# Patient Record
Sex: MALE | Race: WHITE | Employment: OTHER | URBAN - METROPOLITAN AREA
[De-identification: names, ages, dates, MRNs, and addresses within clinical notes are randomized per-mention and may not be internally consistent; named-entity substitution may affect disease eponyms.]

---

## 2017-01-04 ENCOUNTER — OFFICE VISIT (OUTPATIENT)
Dept: DERMATOLOGY CLINIC | Facility: CLINIC | Age: 67
End: 2017-01-04

## 2017-01-04 DIAGNOSIS — L57.0 AK (ACTINIC KERATOSIS): Primary | ICD-10-CM

## 2017-01-04 DIAGNOSIS — D23.9 BENIGN NEOPLASM OF SKIN, UNSPECIFIED LOCATION: ICD-10-CM

## 2017-01-04 DIAGNOSIS — L71.9 ROSACEA: ICD-10-CM

## 2017-01-04 DIAGNOSIS — L21.9 SEBORRHEIC DERMATITIS: ICD-10-CM

## 2017-01-04 PROCEDURE — 99213 OFFICE O/P EST LOW 20 MIN: CPT | Performed by: DERMATOLOGY

## 2017-01-04 PROCEDURE — G0463 HOSPITAL OUTPT CLINIC VISIT: HCPCS | Performed by: DERMATOLOGY

## 2017-01-04 RX ORDER — KETOCONAZOLE 20 MG/ML
SHAMPOO TOPICAL
Qty: 120 ML | Refills: 6 | Status: SHIPPED | OUTPATIENT
Start: 2017-01-04 | End: 2018-01-24

## 2017-01-11 PROCEDURE — 83036 HEMOGLOBIN GLYCOSYLATED A1C: CPT | Performed by: INTERNAL MEDICINE

## 2017-01-11 PROCEDURE — 36415 COLL VENOUS BLD VENIPUNCTURE: CPT | Performed by: INTERNAL MEDICINE

## 2017-01-11 PROCEDURE — 80061 LIPID PANEL: CPT | Performed by: INTERNAL MEDICINE

## 2017-01-11 PROCEDURE — 80053 COMPREHEN METABOLIC PANEL: CPT | Performed by: INTERNAL MEDICINE

## 2017-01-19 ENCOUNTER — TELEPHONE (OUTPATIENT)
Dept: DERMATOLOGY CLINIC | Facility: CLINIC | Age: 67
End: 2017-01-19

## 2017-01-30 NOTE — PROGRESS NOTES
Viraj Coyne is a 77year old male. HPI:     CC:  Patient presents with:  Full Skin Exam: Pt requesting full body skin evaluation. No personal or family history of skin cancer.          Allergies:  Review of patient's allergies indicates no known a unspecified hyperlipidemia    • CAD (coronary artery disease) 12/10/2014     Ptca/vinayak '04    • HTN (hypertension) 12/10/2014   • S/P coronary artery stent placement 2004   • H/O cardiovascular stress test      EKG stress test   • Obesity    • Allergic rhin total-body performed, including scalp, head, neck, face,nails, hair, external eyes, including conjunctival mucosa, eyelids, lips external ears, back, chest,/ breasts, axillae,  abdomen, arms, legs, palms.      Multiple light to medium brown, well marginated observe. Please refer to map for specific lesions. See additional diagnoses. Pros cons of various therapies, risks benefits discussed. Pathophysiology discussed with patient. Therapeutic options reviewed. See  Medications in grid.   Instructions revie

## 2017-05-03 ENCOUNTER — OFFICE VISIT (OUTPATIENT)
Dept: DERMATOLOGY CLINIC | Facility: CLINIC | Age: 67
End: 2017-05-03

## 2017-05-03 DIAGNOSIS — L57.0 AK (ACTINIC KERATOSIS): Primary | ICD-10-CM

## 2017-05-03 DIAGNOSIS — D23.5 BENIGN NEOPLASM OF SKIN OF TRUNK, EXCEPT SCROTUM: ICD-10-CM

## 2017-05-03 DIAGNOSIS — D23.30 BENIGN NEOPLASM OF SKIN OF FACE: ICD-10-CM

## 2017-05-03 DIAGNOSIS — D23.60 BENIGN NEOPLASM OF SKIN OF UPPER LIMB, INCLUDING SHOULDER, UNSPECIFIED LATERALITY: ICD-10-CM

## 2017-05-03 DIAGNOSIS — D23.70 BENIGN NEOPLASM OF SKIN OF LOWER LIMB, INCLUDING HIP, UNSPECIFIED LATERALITY: ICD-10-CM

## 2017-05-03 PROCEDURE — 99213 OFFICE O/P EST LOW 20 MIN: CPT | Performed by: DERMATOLOGY

## 2017-05-03 PROCEDURE — 17000 DESTRUCT PREMALG LESION: CPT | Performed by: DERMATOLOGY

## 2017-05-03 PROCEDURE — 17003 DESTRUCT PREMALG LES 2-14: CPT | Performed by: DERMATOLOGY

## 2017-05-22 NOTE — PROGRESS NOTES
Francois Thortnon is a 77year old male. HPI:     CC:  Patient presents with:  Actinic Keratosis: Pt here for 4 mo f/u. Pt states kmt plans to re-check pre-cancers at hands and upper body.   Pls check area at L upper thigh, new bumps where mole was re mouth daily.  Disp:  Rfl:      Allergies:   No Known Allergies    Past Medical History   Diagnosis Date   • Unspecified essential hypertension    • Other and unspecified hyperlipidemia    • CAD (coronary artery disease) 12/10/2014     Ptca/vinayak '04    • HTN complaints. No new or changeing lesions other than noted above. No fevers, chills, night sweats, unusual sun sensitivity. No other skin complaints. History, medications, allergies reviewed as noted.        Physical Examination:     Well-developed w podiatry. Patient does not want to consider surgery at this time. Discussed local measures to keep friction down has been applying Neosporin Band-Aid may continue with this. Please refer to map for specific lesions. See additional diagnoses.   Pros cons

## 2017-09-06 ENCOUNTER — OFFICE VISIT (OUTPATIENT)
Dept: DERMATOLOGY CLINIC | Facility: CLINIC | Age: 67
End: 2017-09-06

## 2017-09-06 DIAGNOSIS — D23.30 BENIGN NEOPLASM OF SKIN OF FACE: ICD-10-CM

## 2017-09-06 DIAGNOSIS — L21.9 SEBORRHEIC DERMATITIS: ICD-10-CM

## 2017-09-06 DIAGNOSIS — L71.9 ROSACEA: Primary | ICD-10-CM

## 2017-09-06 DIAGNOSIS — L57.0 AK (ACTINIC KERATOSIS): ICD-10-CM

## 2017-09-06 DIAGNOSIS — D23.9 BENIGN NEOPLASM OF SKIN, UNSPECIFIED LOCATION: ICD-10-CM

## 2017-09-06 PROCEDURE — 99213 OFFICE O/P EST LOW 20 MIN: CPT | Performed by: DERMATOLOGY

## 2017-09-06 PROCEDURE — G0463 HOSPITAL OUTPT CLINIC VISIT: HCPCS | Performed by: DERMATOLOGY

## 2017-09-06 RX ORDER — DOXYCYCLINE HYCLATE 100 MG/1
100 CAPSULE ORAL 2 TIMES DAILY
Qty: 60 CAPSULE | Refills: 2 | Status: SHIPPED | OUTPATIENT
Start: 2017-09-06 | End: 2018-01-24

## 2017-09-17 NOTE — PROGRESS NOTES
Lisa Carbajal is a 79year old male. HPI:     CC:  Patient presents with:  Actinic Keratosis: LOV 5/3/2017. Pt presenting for 4 month f/u with AKs to forehead, nose and bilateral hands.          Allergies:  Review of patient's allergies indicates no External Cream Use bid to face Disp: 60 g Rfl: 6     Allergies:   No Known Allergies    Past Medical History:   Diagnosis Date   • Allergic rhinitis    • CAD (coronary artery disease) 12/10/2014    Ptca/vinayak '04    • H/O cardiovascular stress test     EKG s other than noted above. No fevers, chills, night sweats, unusual sun sensitivity. No other skin complaints. History, medications, allergies reviewed as noted.        Physical Examination:     Well-developed well-nourished patient alert oriented in n eczematous possible notalgia paresthetica, xerosis multifactorial.  No suspicious lesions. AK is difficult to assess. Recheck later in the fall    Please refer to map for specific lesions. See additional diagnoses.   Pros cons of various therapies, risks

## 2017-09-19 ENCOUNTER — TELEPHONE (OUTPATIENT)
Dept: DERMATOLOGY CLINIC | Facility: CLINIC | Age: 67
End: 2017-09-19

## 2017-09-19 NOTE — TELEPHONE ENCOUNTER
LOV 9/6/17. Pt calling with 2 week update on Rosacea. Currently taking doxy twice a day and applying metrocream daily. Per pt, nose and lower eyelids are 85-90% clear. Hairline has been showing no improvement (red and scaly) \"Like a thick sunburn\".  Raul

## 2017-09-19 NOTE — TELEPHONE ENCOUNTER
Pt stopped in to let us know that his issues is getting better in some aspects but still having some issues in others, he was advised by dr. Angela Roger to let us know how he was in two weeks and if he should up the meds or what he should do next pls advise

## 2017-09-21 NOTE — TELEPHONE ENCOUNTER
Stay on the doxy and topicals --keep the same for now. He should have triamcinolone cream at home --may use this on the scaly areas on the hairline bid. Avoid on the rest of the face as this is better.  May need to re-check if not better over the next 2

## 2017-10-03 ENCOUNTER — TELEPHONE (OUTPATIENT)
Dept: DERMATOLOGY CLINIC | Facility: CLINIC | Age: 67
End: 2017-10-03

## 2017-10-03 NOTE — TELEPHONE ENCOUNTER
LOV 9/6. Pt calling with update on lesions on nose and forehead. He is showing great improvement with doxy twice a day. \"all the sores and blemishes are gone\". % better and no new lesions per pt. Pt has 2 refills left of Doxycycline 100 mg BID.

## 2017-10-03 NOTE — TELEPHONE ENCOUNTER
Pt is letting dr. Danis Kaminski know that the problem is resolving well and he only has 5 antibiotics left.  She asked him to call with an update, he said nurse can call if she feels she needs to

## 2017-10-03 NOTE — TELEPHONE ENCOUNTER
I would continue the 100mg bid for another 2 weeks then decrease to 2 pills a day alternating with 1 a day every other day for 2-3 weeks, then decrease to qd if staying fairly clear

## 2017-10-24 ENCOUNTER — TELEPHONE (OUTPATIENT)
Dept: DERMATOLOGY CLINIC | Facility: CLINIC | Age: 67
End: 2017-10-24

## 2017-10-24 NOTE — TELEPHONE ENCOUNTER
Per last telephone call (10/3/17) pt is to decrease to daily. Infomed pt. He states he is about 90% clear. Verbalized medication instruction understanding.

## 2018-01-24 ENCOUNTER — TELEPHONE (OUTPATIENT)
Dept: DERMATOLOGY CLINIC | Facility: CLINIC | Age: 68
End: 2018-01-24

## 2018-01-25 RX ORDER — KETOCONAZOLE 20 MG/ML
SHAMPOO TOPICAL
Qty: 120 ML | Refills: 6 | Status: SHIPPED | OUTPATIENT
Start: 2018-01-25 | End: 2020-06-02

## 2018-01-25 RX ORDER — DOXYCYCLINE HYCLATE 100 MG/1
100 CAPSULE ORAL DAILY
Qty: 30 CAPSULE | Refills: 2 | Status: SHIPPED | OUTPATIENT
Start: 2018-01-25 | End: 2018-02-24

## 2018-01-25 NOTE — TELEPHONE ENCOUNTER
Rx(s) sent. Directions given to patient per [facility] with verbal understanding. F/u appt made.

## 2018-01-25 NOTE — TELEPHONE ENCOUNTER
Yes please send rf's as needed, rx's pended. Does he need further ketoconazole too ?  rx's . I would decrease the doxycycline to every other day for the next month, if no flare then stop. Continue metronidazole qd. Con't shampoo. same directions.

## 2018-02-26 ENCOUNTER — TELEPHONE (OUTPATIENT)
Dept: DERMATOLOGY CLINIC | Facility: CLINIC | Age: 68
End: 2018-02-26

## 2018-02-26 NOTE — TELEPHONE ENCOUNTER
Pt contacted, states that he will be complete with 15 day course of doxycycline, took one every other day for the last almost 30 days- has 2 pills remaining. Pt notes that rosacea is not currently flaring.      Pt asked if he should continue doxycycline bec

## 2018-02-26 NOTE — TELEPHONE ENCOUNTER
Pt finished antibiotics on Saturday. Please call to let pt know if he should continue with medication.

## 2018-04-25 ENCOUNTER — OFFICE VISIT (OUTPATIENT)
Dept: DERMATOLOGY CLINIC | Facility: CLINIC | Age: 68
End: 2018-04-25

## 2018-04-25 DIAGNOSIS — D23.4 BENIGN NEOPLASM OF SCALP AND SKIN OF NECK: ICD-10-CM

## 2018-04-25 DIAGNOSIS — D23.30 BENIGN NEOPLASM OF SKIN OF FACE: ICD-10-CM

## 2018-04-25 DIAGNOSIS — L57.0 AK (ACTINIC KERATOSIS): Primary | ICD-10-CM

## 2018-04-25 DIAGNOSIS — D23.60 BENIGN NEOPLASM OF SKIN OF UPPER LIMB, INCLUDING SHOULDER, UNSPECIFIED LATERALITY: ICD-10-CM

## 2018-04-25 DIAGNOSIS — L21.9 SEBORRHEIC DERMATITIS: ICD-10-CM

## 2018-04-25 PROCEDURE — 17003 DESTRUCT PREMALG LES 2-14: CPT | Performed by: DERMATOLOGY

## 2018-04-25 PROCEDURE — 17000 DESTRUCT PREMALG LESION: CPT | Performed by: DERMATOLOGY

## 2018-04-25 PROCEDURE — 99213 OFFICE O/P EST LOW 20 MIN: CPT | Performed by: DERMATOLOGY

## 2018-05-06 NOTE — PROGRESS NOTES
Lety De Los Santos is a 79year old male. HPI:     CC:  Patient presents with:  Derm Problem: established pt. presents for 7 months F/U. Requesting a skin exam to face and hands only.  \"Sometimes my face and hairline gets flaky - sometimes I get lesions mouth daily.  Disp:  Rfl:      Allergies:   No Known Allergies    Past Medical History:   Diagnosis Date   • Allergic rhinitis    • CAD (coronary artery disease) 12/10/2014    Ptca/vinayak '04    • H/O cardiovascular stress test     EKG stress test   • HTN (hyp allergies reviewed as noted. ROS:  Denies any other systemic complaints. No new or changeing lesions other than noted above. No fevers, chills, night sweats, unusual sun sensitivity. No other skin complaints.         History, medications, allergies r scaling keratotic papules new active lesions central forehead left forehead bilateral temples. x4  Actinic keratoses. Precancerous nature discussed. Lesions treated with cryo- . Biopsy if not resolved. Waxy tan papule at left parietal scalp.   Benign

## 2018-09-26 ENCOUNTER — OFFICE VISIT (OUTPATIENT)
Dept: DERMATOLOGY CLINIC | Facility: CLINIC | Age: 68
End: 2018-09-26
Payer: MEDICARE

## 2018-09-26 DIAGNOSIS — L57.0 AK (ACTINIC KERATOSIS): Primary | ICD-10-CM

## 2018-09-26 DIAGNOSIS — D23.60 BENIGN NEOPLASM OF SKIN OF UPPER LIMB, INCLUDING SHOULDER, UNSPECIFIED LATERALITY: ICD-10-CM

## 2018-09-26 DIAGNOSIS — L71.9 ROSACEA: ICD-10-CM

## 2018-09-26 DIAGNOSIS — D23.5 BENIGN NEOPLASM OF SKIN OF TRUNK, EXCEPT SCROTUM: ICD-10-CM

## 2018-09-26 DIAGNOSIS — D23.30 BENIGN NEOPLASM OF SKIN OF FACE: ICD-10-CM

## 2018-09-26 DIAGNOSIS — D23.4 BENIGN NEOPLASM OF SCALP AND SKIN OF NECK: ICD-10-CM

## 2018-09-26 DIAGNOSIS — L21.9 SEBORRHEIC DERMATITIS: ICD-10-CM

## 2018-09-26 PROCEDURE — 99213 OFFICE O/P EST LOW 20 MIN: CPT | Performed by: DERMATOLOGY

## 2018-09-26 PROCEDURE — 17000 DESTRUCT PREMALG LESION: CPT | Performed by: DERMATOLOGY

## 2018-09-26 RX ORDER — FLUOROURACIL 50 MG/G
CREAM TOPICAL
Qty: 40 G | Refills: 1 | Status: SHIPPED | OUTPATIENT
Start: 2018-09-26 | End: 2019-11-15

## 2018-10-07 NOTE — PROGRESS NOTES
Jailene Washington is a 76year old male. HPI:     CC:  Patient presents with:  Actinic Keratosis: LOV 4-25-18. Pt here for f/u of hands and scalp.  c/o new sensitive lesion at top of R hand.   Also c/o of itchy back, sometimes uses scalp shampoo, but \ Cream Apply topically 2 (two) times daily. Apply bid Disp: 454 g Rfl: 2   aspirin 81 MG Oral Tab Take 81 mg by mouth daily.  Disp:  Rfl:      Allergies:   No Known Allergies    Past Medical History:   Diagnosis Date   • Allergic rhinitis    • CAD (coronary Transfusions: Not Asked        Caffeine Concern: Yes          soda; 36 oz.         Occupational Exposure: Not Asked        Hobby Hazards: Not Asked        Sleep Concern: Not Asked        Stress Concern: Not Asked        Weight Concern: Not Asked        Spec specific lesions. Background erythema papules pustules    Complaining of itchy area at back eczematous changes erythema noted no suspicious lesions  Otherwise remarkable for lesions as noted on map.       Assessment / plan:    No orders of the defined type medication use and may alternate weeks if necessary. Increased redness scaling crusting irritation pain tenderness potential discussed. Anticipate one month for resolution of symptoms. Plan recheck in one month after completion of treatment course.   Con

## 2019-01-09 ENCOUNTER — OFFICE VISIT (OUTPATIENT)
Dept: DERMATOLOGY CLINIC | Facility: CLINIC | Age: 69
End: 2019-01-09
Payer: MEDICARE

## 2019-01-09 DIAGNOSIS — D23.60 BENIGN NEOPLASM OF SKIN OF UPPER LIMB, INCLUDING SHOULDER, UNSPECIFIED LATERALITY: ICD-10-CM

## 2019-01-09 DIAGNOSIS — D23.4 BENIGN NEOPLASM OF SCALP AND SKIN OF NECK: ICD-10-CM

## 2019-01-09 DIAGNOSIS — L57.0 AK (ACTINIC KERATOSIS): Primary | ICD-10-CM

## 2019-01-09 DIAGNOSIS — D23.5 BENIGN NEOPLASM OF SKIN OF TRUNK, EXCEPT SCROTUM: ICD-10-CM

## 2019-01-09 DIAGNOSIS — D23.30 BENIGN NEOPLASM OF SKIN OF FACE: ICD-10-CM

## 2019-01-09 DIAGNOSIS — L21.9 SEBORRHEIC DERMATITIS: ICD-10-CM

## 2019-01-09 DIAGNOSIS — L71.9 ROSACEA: ICD-10-CM

## 2019-01-09 PROCEDURE — 17003 DESTRUCT PREMALG LES 2-14: CPT | Performed by: DERMATOLOGY

## 2019-01-09 PROCEDURE — 17000 DESTRUCT PREMALG LESION: CPT | Performed by: DERMATOLOGY

## 2019-01-09 PROCEDURE — 99213 OFFICE O/P EST LOW 20 MIN: CPT | Performed by: DERMATOLOGY

## 2019-01-20 NOTE — PROGRESS NOTES
Spencer Ceballos is a 76year old male. HPI:     CC:  Patient presents with:  Actinic Keratosis: Patient present with follow up - patient states that have not been any changes        Allergies:  Patient has no known allergies.     HISTORY:    Past Medi Medical History:   Diagnosis Date   • Allergic rhinitis    • CAD (coronary artery disease) 12/10/2014    Ptca/vinayak '04    • H/O cardiovascular stress test     EKG stress test   • HTN (hypertension) 12/10/2014   • Obesity    • Other and unspecified hyperlipi Asked        Stress Concern: Not Asked        Weight Concern: Not Asked        Special Diet: Not Asked        Back Care: Not Asked        Exercise: Not Asked        Bike Helmet: Not Asked        Seat Belt: Not Asked        Self-Exams: Not Asked    Social H placed in this encounter.       Meds & Refills for this Visit:  Requested Prescriptions      No prescriptions requested or ordered in this encounter         Ak (actinic keratosis)  (primary encounter diagnosis)  Rosacea  Seborrheic dermatitis  Benign neopla crusting irritation pain tenderness potential discussed. Anticipate one month for resolution of symptoms. Plan recheck in one month after completion of treatment course. Consider alternative treatment biopsy if not resolved.     Waxy tan papule at left p

## 2019-07-10 ENCOUNTER — OFFICE VISIT (OUTPATIENT)
Dept: DERMATOLOGY CLINIC | Facility: CLINIC | Age: 69
End: 2019-07-10
Payer: MEDICARE

## 2019-07-10 DIAGNOSIS — L21.9 SEBORRHEIC DERMATITIS: ICD-10-CM

## 2019-07-10 DIAGNOSIS — D23.5 BENIGN NEOPLASM OF SKIN OF TRUNK, EXCEPT SCROTUM: ICD-10-CM

## 2019-07-10 DIAGNOSIS — D23.60 BENIGN NEOPLASM OF SKIN OF UPPER LIMB, INCLUDING SHOULDER, UNSPECIFIED LATERALITY: ICD-10-CM

## 2019-07-10 DIAGNOSIS — D23.30 BENIGN NEOPLASM OF SKIN OF FACE: ICD-10-CM

## 2019-07-10 DIAGNOSIS — L57.0 AK (ACTINIC KERATOSIS): Primary | ICD-10-CM

## 2019-07-10 DIAGNOSIS — L71.9 ROSACEA: ICD-10-CM

## 2019-07-10 DIAGNOSIS — D23.4 BENIGN NEOPLASM OF SCALP AND SKIN OF NECK: ICD-10-CM

## 2019-07-10 PROCEDURE — 17003 DESTRUCT PREMALG LES 2-14: CPT | Performed by: DERMATOLOGY

## 2019-07-10 PROCEDURE — 17000 DESTRUCT PREMALG LESION: CPT | Performed by: DERMATOLOGY

## 2019-07-10 PROCEDURE — G0463 HOSPITAL OUTPT CLINIC VISIT: HCPCS | Performed by: DERMATOLOGY

## 2019-07-10 PROCEDURE — 99213 OFFICE O/P EST LOW 20 MIN: CPT | Performed by: DERMATOLOGY

## 2019-07-21 NOTE — PROGRESS NOTES
Spencer Ceballos is a 76year old male. HPI:     CC:  Patient presents with:  Actinic Keratosis: LOV 1/9/19. pt presenting today with f/u on Ak's. no new concerns. Allergies:  Patient has no known allergies.     HISTORY:    Past Medical History: Medical History:   Diagnosis Date   • Allergic rhinitis    • CAD (coronary artery disease) 12/10/2014    Ptca/vinayak '04    • H/O cardiovascular stress test     EKG stress test   • HTN (hypertension) 12/10/2014   • Obesity    • Other and unspecified hyperlipi violence:        Fear of current or ex partner: Not on file        Emotionally abused: Not on file        Physically abused: Not on file        Forced sexual activity: Not on file    Other Topics      Concerns:        G[place] up on a farm: Not Asked        Hi distress. Exam performed, including scalp, head, neck, face,nails, hair, external eyes, including conjunctival mucosa, eyelids, lips external ears , arms, digits,palms.      Multiple light to medium brown, well marginated, uniformly pigmented, macules and cryo- .  Biopsy if not resolved. x4 we will follow-up with fluorouracil to these areas as well. Continue careful sun protection    More diffuse lesions. Actinic keratoses face arms hands especially lesion at right hand actinic keratoses.   Precancerous n

## 2019-10-09 ENCOUNTER — OFFICE VISIT (OUTPATIENT)
Dept: DERMATOLOGY CLINIC | Facility: CLINIC | Age: 69
End: 2019-10-09
Payer: MEDICARE

## 2019-10-09 DIAGNOSIS — D23.4 BENIGN NEOPLASM OF SCALP AND SKIN OF NECK: ICD-10-CM

## 2019-10-09 DIAGNOSIS — D23.60 BENIGN NEOPLASM OF SKIN OF UPPER LIMB, INCLUDING SHOULDER, UNSPECIFIED LATERALITY: ICD-10-CM

## 2019-10-09 DIAGNOSIS — L71.9 ROSACEA: ICD-10-CM

## 2019-10-09 DIAGNOSIS — L21.9 SEBORRHEIC DERMATITIS: ICD-10-CM

## 2019-10-09 DIAGNOSIS — L82.1 SEBORRHEIC KERATOSES: ICD-10-CM

## 2019-10-09 DIAGNOSIS — L57.0 AK (ACTINIC KERATOSIS): Primary | ICD-10-CM

## 2019-10-09 DIAGNOSIS — D23.30 BENIGN NEOPLASM OF SKIN OF FACE: ICD-10-CM

## 2019-10-09 PROCEDURE — 17003 DESTRUCT PREMALG LES 2-14: CPT | Performed by: DERMATOLOGY

## 2019-10-09 PROCEDURE — 17000 DESTRUCT PREMALG LESION: CPT | Performed by: DERMATOLOGY

## 2019-10-09 PROCEDURE — 99213 OFFICE O/P EST LOW 20 MIN: CPT | Performed by: DERMATOLOGY

## 2019-10-09 PROCEDURE — G0463 HOSPITAL OUTPT CLINIC VISIT: HCPCS | Performed by: DERMATOLOGY

## 2019-10-09 RX ORDER — FLUOROURACIL 50 MG/G
CREAM TOPICAL WEEKLY
COMMUNITY
End: 2021-06-10

## 2019-10-20 NOTE — PROGRESS NOTES
Tisha Chilel is a 71year old male. HPI:     CC:  Patient presents with:  Actinic Keratosis: LOV 7/10/2019. Pt presenting for f/u with AKs to face, hands, and arms.  Pt previously treated at [address] [place] [address] with cryo, pt also using fluorouracil at home once we Apply bid, Disp: 454 g, Rfl: 2  aspirin 81 MG Oral Tab, Take 81 mg by mouth daily. , Disp: , Rfl:       Allergies:   No Known Allergies    Past Medical History:   Diagnosis Date   • Allergic rhinitis    • CAD (coronary artery disease) 12/10/2014    Ptca/vinayak organization: Not on file        Attends meetings of clubs or organizations: Not on file        Relationship status: Not on file      Intimate partner violence:        Fear of current or ex partner: Not on file        Emotionally abused: Not on file allergies reviewed as noted. ROS:  Denies any other systemic complaints. No new or changeing lesions other than noted above. No fevers, chills, night sweats, unusual sun sensitivity. No other skin complaints.         History, medications, allergies r doing over the next several weeks. Await clinical response to above therapy. .  Doxycyclinedaily if improving continue metronidazole continue metronidazole is been helpful continue Nizoral    Seborrheic dermatitis Nizoral, path ophysiology over-the-counter TERENCE's of melanoma discussed with patient. Sunscreen use, sun protection, self exams reviewed. Followup as noted RTC ---routine checkup    6 mos -one year or p.r.n. The patient indicates understanding of these issues and agrees to the plan.   [employer]

## 2019-10-31 NOTE — TELEPHONE ENCOUNTER
LOV 9/6/17, pt states he finished his doxycycline (has been taking 1 tab daily) and metronidazole QD PRN to face. States he was supposed to call with update. Denies any burning/itching to face. He continues to use ketoconazole shampoo 2x weekly.  States he Do not rub the eye.  Follow up at the office 11/1/19 Do not rub the eye.  Follow up at the office 11/1/19 8am [facility]

## 2019-11-15 NOTE — TELEPHONE ENCOUNTER
Received refill request for Fluorouracil 5% external cream- apply 2 times weekly. LOV 10-9-19. Refill pended and forwarded to Dr. Mya Ledbetter for further directions.

## 2019-11-16 RX ORDER — FLUOROURACIL 50 MG/G
CREAM TOPICAL
Qty: 40 G | Refills: 0 | Status: SHIPPED | OUTPATIENT
Start: 2019-11-16 | End: 2019-12-24

## 2019-12-24 RX ORDER — FLUOROURACIL 50 MG/G
CREAM TOPICAL
Qty: 40 G | Refills: 1 | Status: SHIPPED | OUTPATIENT
Start: 2019-12-24

## 2019-12-24 NOTE — TELEPHONE ENCOUNTER
Received a refill request for Fluorouracil 5 % external cream -apply 2 times a day. LOV 10-9-19 to follow up in 6 months to 1 year. Refill pended and forwarded to Dr. Cecelia Trejo for further directions.

## 2020-05-22 ENCOUNTER — OFFICE VISIT (OUTPATIENT)
Dept: DERMATOLOGY CLINIC | Facility: CLINIC | Age: 70
End: 2020-05-22
Payer: MEDICARE

## 2020-05-22 DIAGNOSIS — D23.30 BENIGN NEOPLASM OF SKIN OF FACE: ICD-10-CM

## 2020-05-22 DIAGNOSIS — L30.9 DERMATITIS: ICD-10-CM

## 2020-05-22 DIAGNOSIS — D23.5 BENIGN NEOPLASM OF SKIN OF TRUNK, EXCEPT SCROTUM: ICD-10-CM

## 2020-05-22 DIAGNOSIS — D23.4 BENIGN NEOPLASM OF SCALP AND SKIN OF NECK: ICD-10-CM

## 2020-05-22 DIAGNOSIS — D23.60 BENIGN NEOPLASM OF SKIN OF UPPER LIMB, INCLUDING SHOULDER, UNSPECIFIED LATERALITY: ICD-10-CM

## 2020-05-22 DIAGNOSIS — L57.0 AK (ACTINIC KERATOSIS): Primary | ICD-10-CM

## 2020-05-22 PROCEDURE — 99213 OFFICE O/P EST LOW 20 MIN: CPT | Performed by: DERMATOLOGY

## 2020-05-22 PROCEDURE — G0463 HOSPITAL OUTPT CLINIC VISIT: HCPCS | Performed by: DERMATOLOGY

## 2020-05-31 NOTE — PROGRESS NOTES
Andry Sousa is a 71year old male. HPI:     CC:  Patient presents with:  Actinic Keratosis: LOV 10/9/2019 - Pt presenting for 6 month f/u for AK's to arms, hands, and scalp. Pt states he thinks he had an allergic reaction to the Fluorouracil. night as directed x 6 weeks (Patient not taking: Reported on 5/22/2020) 40 g 1   • fluorouracil 5 % External Cream Apply topically once a week.      • metRONIDAZOLE 0.75 % External Cream Use bid to face (Patient not taking: Reported on 5/22/2020 ) 60 g 6 Talks on phone: Not on file        Gets together: Not on file        Attends [employer] service: Not on file        Active member of club or organization: Not on file        Attends meetings of clubs or organizations: Not on file        Relationship statu areas had significant redness crusting scaling. Discontinued after 3 weeks of use of the fluorouracil. Does not recall any unusual exposures. Had used this over larger areas still crusty over the elbows and wrists not use anything topically currently. neoplasm of skin of face  Benign neoplasm of skin of upper limb, including shoulder, unspecified laterality  Benign neoplasm of skin of trunk, except scrotum  . Erythematous patches elbows arms hands. Achy significantly improved.   Still some erythema sca nevi, seborrheic  keratoses, cherry angiomas:  Reassurance regarding other benign skin lesions. Signs and symptoms of skin cancer, ABCDE's of melanoma discussed with patient. Sunscreen use, sun protection, self exams reviewed.   Followup as noted RTC ---rout

## 2020-06-02 RX ORDER — KETOCONAZOLE 20 MG/ML
SHAMPOO TOPICAL
Qty: 120 ML | Refills: 3 | Status: SHIPPED | OUTPATIENT
Start: 2020-06-02 | End: 2022-01-29

## 2020-10-30 ENCOUNTER — OFFICE VISIT (OUTPATIENT)
Dept: DERMATOLOGY CLINIC | Facility: CLINIC | Age: 70
End: 2020-10-30
Payer: MEDICARE

## 2020-10-30 DIAGNOSIS — D23.4 BENIGN NEOPLASM OF SCALP AND SKIN OF NECK: ICD-10-CM

## 2020-10-30 DIAGNOSIS — D23.60 BENIGN NEOPLASM OF SKIN OF UPPER LIMB, INCLUDING SHOULDER, UNSPECIFIED LATERALITY: ICD-10-CM

## 2020-10-30 DIAGNOSIS — L71.9 ROSACEA: ICD-10-CM

## 2020-10-30 DIAGNOSIS — L21.9 SEBORRHEIC DERMATITIS: ICD-10-CM

## 2020-10-30 DIAGNOSIS — D23.5 BENIGN NEOPLASM OF SKIN OF TRUNK, EXCEPT SCROTUM: ICD-10-CM

## 2020-10-30 DIAGNOSIS — L57.0 AK (ACTINIC KERATOSIS): Primary | ICD-10-CM

## 2020-10-30 DIAGNOSIS — L82.1 SEBORRHEIC KERATOSES: ICD-10-CM

## 2020-10-30 DIAGNOSIS — D23.30 BENIGN NEOPLASM OF SKIN OF FACE: ICD-10-CM

## 2020-10-30 PROCEDURE — 17000 DESTRUCT PREMALG LESION: CPT | Performed by: DERMATOLOGY

## 2020-10-30 PROCEDURE — G0463 HOSPITAL OUTPT CLINIC VISIT: HCPCS | Performed by: DERMATOLOGY

## 2020-10-30 PROCEDURE — 99213 OFFICE O/P EST LOW 20 MIN: CPT | Performed by: DERMATOLOGY

## 2020-10-30 RX ORDER — KETOCONAZOLE 20 MG/G
1 CREAM TOPICAL 2 TIMES DAILY
Qty: 30 G | Refills: 3 | Status: SHIPPED | OUTPATIENT
Start: 2020-10-30 | End: 2021-06-10

## 2020-10-30 NOTE — PATIENT INSTRUCTIONS
Use the triamcinolone for 2-3 days to neck/chin beard area    Use the nizoral shampoo to wash chest and back 2-3 times weekly, may use this on the beard area and eyebrows also    ketoconazole cream on face/beard area for red scaly patches    fluorouracil 1

## 2020-11-08 NOTE — PROGRESS NOTES
Clyde Pruitt is a 79year old male. HPI:     CC:  Patient presents with:  Full Skin Exam: LOV 5/22/2020 - Pt presenting for full body skin exam.  Pt has personal hx of AK's.   Pt states spots on scalp continue to reappear and seem to be taking long as directed x 6 weeks 40 g 1   • metRONIDAZOLE 0.75 % External Cream Use bid to face 60 g 6   • triamcinolone acetonide 0.1 % External Cream Apply topically 2 (two) times daily. Apply bid 454 g 2   • aspirin 81 MG Oral Tab Take 81 mg by mouth daily.      • Stress: Not on file    Relationships      Social connections        Talks on phone: Not on file        Gets together: Not on file        Attends [employer] service: Not on file        Active member of club or organization: Not on file        Attends meeting been using this over the arms hands and scalp at the same time over larger areas had significant redness crusting scaling. Headache/malaise with use over large areas discontinued after 3 weeks of use of the fluorouracil.   Does not recall any unusual expos Prescriptions Disp Refills   • ketoconazole 2 % External Cream 30 g 3     Sig: Apply 1 Application topically 2 (two) times daily. Apply to affected area 2 times daily.          Ak (actinic keratosis)  (primary encounter diagnosis)  Seborrheic dermatitis  Se paresthetica upper back stable. Hand dermatitis triamcinolone as needed. Continue regular follow-ups we will plan recheck in 6 months or as needed    Recheck 4 to 6 months or as needed  Please refer to map for specific lesions.   See additional diagnose

## 2021-04-30 ENCOUNTER — OFFICE VISIT (OUTPATIENT)
Dept: DERMATOLOGY CLINIC | Facility: CLINIC | Age: 71
End: 2021-04-30
Payer: MEDICARE

## 2021-04-30 DIAGNOSIS — L57.0 AK (ACTINIC KERATOSIS): Primary | ICD-10-CM

## 2021-04-30 DIAGNOSIS — L71.9 ROSACEA: ICD-10-CM

## 2021-04-30 DIAGNOSIS — D23.4 BENIGN NEOPLASM OF SCALP AND SKIN OF NECK: ICD-10-CM

## 2021-04-30 DIAGNOSIS — L21.9 SEBORRHEIC DERMATITIS: ICD-10-CM

## 2021-04-30 DIAGNOSIS — D23.9 BENIGN NEOPLASM OF SKIN, UNSPECIFIED LOCATION: ICD-10-CM

## 2021-04-30 DIAGNOSIS — L82.1 SEBORRHEIC KERATOSES: ICD-10-CM

## 2021-04-30 DIAGNOSIS — D23.30 BENIGN NEOPLASM OF SKIN OF FACE: ICD-10-CM

## 2021-04-30 PROCEDURE — 99213 OFFICE O/P EST LOW 20 MIN: CPT | Performed by: DERMATOLOGY

## 2021-04-30 PROCEDURE — 17000 DESTRUCT PREMALG LESION: CPT | Performed by: DERMATOLOGY

## 2021-05-10 NOTE — PROGRESS NOTES
William Tovar is a 79year old male. HPI:     CC:  Patient presents with:  Full Skin Exam: LOV 10/30/20. pt presenting today with 6 month full body skin check. pt has HX of AK's        Allergies:  Patient has no known allergies.     HISTORY:    Past (Patient not taking: Reported on 4/30/2021) 40 g 1   • fluorouracil 5 % External Cream Apply topically once a week.  (Patient not taking: Reported on 4/30/2021 )     • metRONIDAZOLE 0.75 % External Cream Use bid to face (Patient not taking: Reported on 4/30 No         Service: Not Asked        Blood Transfusions: Not Asked        Caffeine Concern: Yes          soda; 36 oz.         Occupational Exposure: Not Asked        Hobby Hazards: Not Asked        Sleep Concern: Not Asked        Stress Concern: Not has been helpful. Has not been using this as frequently on the face in the past had used weekly has noted significant improvement with this.   Had been using this over the arms hands and scalp at the same time over larger areas had significant redness junior Assessment / plan:    No orders of the defined types were placed in this encounter.       Meds & Refills for this Visit:  Requested Prescriptions      No prescriptions requested or ordered in this encounter         Ak (actinic keratosis)  (primary encou discussed. Overall doing fairly well  More diffuse dermatitis over the arms hands chest back add Zyrtec due to twice daily. Await response if not helping he will let us know.   We will plan routine follow-up in 6 months    Waxy tan plaques at temples, par

## 2021-08-31 PROBLEM — E66.01 MORBID OBESITY WITH BMI OF 40.0-44.9, ADULT (HCC): Status: ACTIVE | Noted: 2021-08-31

## 2021-08-31 PROBLEM — E88.81 INSULIN RESISTANCE: Status: ACTIVE | Noted: 2021-08-31

## 2021-08-31 PROBLEM — R73.01 IFG (IMPAIRED FASTING GLUCOSE): Status: ACTIVE | Noted: 2021-08-31

## 2021-08-31 PROBLEM — E88.819 INSULIN RESISTANCE: Status: ACTIVE | Noted: 2021-08-31

## 2021-10-29 ENCOUNTER — OFFICE VISIT (OUTPATIENT)
Dept: DERMATOLOGY CLINIC | Facility: CLINIC | Age: 71
End: 2021-10-29
Payer: MEDICARE

## 2021-10-29 DIAGNOSIS — L71.9 ROSACEA: ICD-10-CM

## 2021-10-29 DIAGNOSIS — L82.1 SEBORRHEIC KERATOSES: ICD-10-CM

## 2021-10-29 DIAGNOSIS — L57.0 AK (ACTINIC KERATOSIS): Primary | ICD-10-CM

## 2021-10-29 DIAGNOSIS — L21.9 SEBORRHEIC DERMATITIS: ICD-10-CM

## 2021-10-29 DIAGNOSIS — Z51.81 MEDICATION MONITORING ENCOUNTER: ICD-10-CM

## 2021-10-29 DIAGNOSIS — D23.9 BENIGN NEOPLASM OF SKIN, UNSPECIFIED LOCATION: ICD-10-CM

## 2021-10-29 PROCEDURE — 17003 DESTRUCT PREMALG LES 2-14: CPT | Performed by: DERMATOLOGY

## 2021-10-29 PROCEDURE — 99213 OFFICE O/P EST LOW 20 MIN: CPT | Performed by: DERMATOLOGY

## 2021-10-29 PROCEDURE — 17000 DESTRUCT PREMALG LESION: CPT | Performed by: DERMATOLOGY

## 2021-11-14 NOTE — PROGRESS NOTES
Heaven Hays is a 70year old male. HPI:     CC:  Patient presents with:  Full Skin Exam: Hx of AK. LOV 4/30/21. Pt presents for 6 month full body exam.  No new conerns. Allergies:  Patient has no known allergies.     HISTORY:    Past Med Use bid to face (Patient taking differently: as needed. Use bid to face) 60 g 6   • triamcinolone acetonide 0.1 % External Cream Apply topically 2 (two) times daily. Apply bid (Patient taking differently: Apply topically as needed.  Apply bid) 454 g 2   • a Asked        Caffeine Concern: Yes          soda; 36 oz.         Occupational Exposure: Not Asked        Hobby Hazards: Not Asked        Sleep Concern: Not Asked        Stress Concern: Not Asked        Weight Concern: Not Asked        Special Diet: Not Aske health. History, medications, allergies reviewed as noted. ROS:  Denies any other systemic complaints. No new or changeing lesions other than noted above. No fevers, chills, night sweats, unusual sun sensitivity. No other skin complaints.         Hi treated larger at vertex and isolated spots. Redness crusting edema suspected results with the fluorouracil. Overall areas he had used on wider basis have improved significantly.     Again will use once a week for 2 weeks to isolated lesions    Areas over keratoses. Eczema, notalgia paresthetica upper back stable. Hand dermatitis triamcinolone as needed.   Continue regular follow-ups we will plan recheck in 6 months or as needed    Recheck 4 to 6 months or as needed  Please refer to map for specific

## 2022-01-29 RX ORDER — KETOCONAZOLE 20 MG/ML
SHAMPOO TOPICAL
Qty: 120 ML | Refills: 0 | Status: SHIPPED | OUTPATIENT
Start: 2022-01-29

## 2022-02-28 ENCOUNTER — OFFICE VISIT (OUTPATIENT)
Dept: DERMATOLOGY CLINIC | Facility: CLINIC | Age: 72
End: 2022-02-28
Payer: MEDICARE

## 2022-02-28 DIAGNOSIS — L57.0 AK (ACTINIC KERATOSIS): Primary | ICD-10-CM

## 2022-02-28 DIAGNOSIS — D22.9 MULTIPLE NEVI: ICD-10-CM

## 2022-02-28 DIAGNOSIS — L82.1 SEBORRHEIC KERATOSES: ICD-10-CM

## 2022-02-28 DIAGNOSIS — D23.9 BENIGN NEOPLASM OF SKIN, UNSPECIFIED LOCATION: ICD-10-CM

## 2022-02-28 DIAGNOSIS — L30.9 DERMATITIS: ICD-10-CM

## 2022-02-28 PROCEDURE — 99213 OFFICE O/P EST LOW 20 MIN: CPT | Performed by: DERMATOLOGY

## 2022-02-28 PROCEDURE — 17003 DESTRUCT PREMALG LES 2-14: CPT | Performed by: DERMATOLOGY

## 2022-02-28 PROCEDURE — 17000 DESTRUCT PREMALG LESION: CPT | Performed by: DERMATOLOGY

## 2022-06-27 ENCOUNTER — OFFICE VISIT (OUTPATIENT)
Dept: DERMATOLOGY CLINIC | Facility: CLINIC | Age: 72
End: 2022-06-27
Payer: MEDICARE

## 2022-06-27 DIAGNOSIS — Z51.81 MEDICATION MONITORING ENCOUNTER: ICD-10-CM

## 2022-06-27 DIAGNOSIS — L82.1 SEBORRHEIC KERATOSES: ICD-10-CM

## 2022-06-27 DIAGNOSIS — L71.9 ROSACEA: ICD-10-CM

## 2022-06-27 DIAGNOSIS — L21.9 SEBORRHEIC DERMATITIS: ICD-10-CM

## 2022-06-27 DIAGNOSIS — D22.9 MULTIPLE NEVI: ICD-10-CM

## 2022-06-27 DIAGNOSIS — D23.9 BENIGN NEOPLASM OF SKIN, UNSPECIFIED LOCATION: ICD-10-CM

## 2022-06-27 DIAGNOSIS — L57.0 AK (ACTINIC KERATOSIS): Primary | ICD-10-CM

## 2022-06-27 DIAGNOSIS — L30.9 DERMATITIS: ICD-10-CM

## 2022-06-27 RX ORDER — TOPIRAMATE 25 MG/1
1 TABLET ORAL 2 TIMES DAILY
COMMUNITY
Start: 2022-06-14

## 2022-09-20 ENCOUNTER — HOSPITAL ENCOUNTER (EMERGENCY)
Facility: HOSPITAL | Age: 72
Discharge: HOME | End: 2022-09-20
Attending: EMERGENCY MEDICINE

## 2022-09-20 VITALS
BODY MASS INDEX: 31.83 KG/M2 | OXYGEN SATURATION: 97 % | RESPIRATION RATE: 16 BRPM | DIASTOLIC BLOOD PRESSURE: 79 MMHG | SYSTOLIC BLOOD PRESSURE: 137 MMHG | TEMPERATURE: 98 F | WEIGHT: 210 LBS | HEART RATE: 66 BPM | HEIGHT: 68 IN

## 2022-09-20 DIAGNOSIS — T14.8XXA SKIN ABRASION: Primary | ICD-10-CM
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
## 2022-09-20 PROCEDURE — 90471 IMMUNIZATION ADMIN: CPT

## 2022-09-20 PROCEDURE — 99283 EMERGENCY DEPT VISIT LOW MDM: CPT

## 2022-09-20 NOTE — ED INITIAL ASSESSMENT (HPI)
Pt presents to the ER s/p fall down two steps. Pt denies LOC. Denies pain. States a few cuts to RUE and RLE.  Bleeding controlled at this time    Presented to ER with wife that fell on top of him during fall

## 2022-11-28 ENCOUNTER — OFFICE VISIT (OUTPATIENT)
Dept: DERMATOLOGY CLINIC | Facility: CLINIC | Age: 72
End: 2022-11-28
Payer: MEDICARE

## 2022-11-28 DIAGNOSIS — L57.0 AK (ACTINIC KERATOSIS): Primary | ICD-10-CM

## 2022-11-28 DIAGNOSIS — L71.9 ROSACEA: ICD-10-CM

## 2022-11-28 DIAGNOSIS — L21.9 SEBORRHEIC DERMATITIS: ICD-10-CM

## 2022-11-28 DIAGNOSIS — D23.9 BENIGN NEOPLASM OF SKIN, UNSPECIFIED LOCATION: ICD-10-CM

## 2022-11-28 DIAGNOSIS — D22.9 MULTIPLE NEVI: ICD-10-CM

## 2022-11-28 DIAGNOSIS — L82.1 SEBORRHEIC KERATOSES: ICD-10-CM

## 2022-12-12 ENCOUNTER — OFFICE VISIT (OUTPATIENT)
Dept: FAMILY MEDICINE CLINIC | Facility: CLINIC | Age: 72
End: 2022-12-12
Payer: MEDICARE

## 2022-12-12 VITALS
TEMPERATURE: 97 F | DIASTOLIC BLOOD PRESSURE: 86 MMHG | WEIGHT: 243.13 LBS | HEIGHT: 68 IN | HEART RATE: 50 BPM | BODY MASS INDEX: 36.85 KG/M2 | OXYGEN SATURATION: 97 % | RESPIRATION RATE: 16 BRPM | SYSTOLIC BLOOD PRESSURE: 151 MMHG

## 2022-12-12 DIAGNOSIS — H10.32 ACUTE CONJUNCTIVITIS OF LEFT EYE, UNSPECIFIED ACUTE CONJUNCTIVITIS TYPE: Primary | ICD-10-CM

## 2022-12-12 DIAGNOSIS — R03.0 ELEVATED BLOOD PRESSURE READING: ICD-10-CM

## 2022-12-12 PROCEDURE — 99202 OFFICE O/P NEW SF 15 MIN: CPT | Performed by: PHYSICIAN ASSISTANT

## 2022-12-12 RX ORDER — ERYTHROMYCIN 5 MG/G
OINTMENT OPHTHALMIC
Qty: 1 G | Refills: 0 | Status: SHIPPED | OUTPATIENT
Start: 2022-12-12

## 2023-04-19 ENCOUNTER — OFFICE VISIT (OUTPATIENT)
Dept: DERMATOLOGY CLINIC | Facility: CLINIC | Age: 73
End: 2023-04-19

## 2023-04-19 DIAGNOSIS — D23.9 BENIGN NEOPLASM OF SKIN, UNSPECIFIED LOCATION: ICD-10-CM

## 2023-04-19 DIAGNOSIS — L57.0 AK (ACTINIC KERATOSIS): Primary | ICD-10-CM

## 2023-04-19 DIAGNOSIS — Z51.81 MEDICATION MONITORING ENCOUNTER: ICD-10-CM

## 2023-04-19 DIAGNOSIS — L30.9 DERMATITIS: ICD-10-CM

## 2023-04-19 DIAGNOSIS — L71.9 ROSACEA: ICD-10-CM

## 2023-04-19 DIAGNOSIS — L82.1 SEBORRHEIC KERATOSES: ICD-10-CM

## 2023-04-19 DIAGNOSIS — L21.9 SEBORRHEIC DERMATITIS: ICD-10-CM

## 2023-04-19 DIAGNOSIS — D22.9 MULTIPLE NEVI: ICD-10-CM

## 2023-04-19 PROCEDURE — 99213 OFFICE O/P EST LOW 20 MIN: CPT | Performed by: DERMATOLOGY

## 2023-04-19 PROCEDURE — 17000 DESTRUCT PREMALG LESION: CPT | Performed by: DERMATOLOGY

## 2023-04-19 RX ORDER — ASPIRIN 81 MG/1
81 TABLET, CHEWABLE ORAL DAILY
COMMUNITY

## 2023-05-23 ENCOUNTER — HOSPITAL ENCOUNTER (OUTPATIENT)
Dept: ULTRASOUND IMAGING | Age: 73
Discharge: HOME | End: 2023-05-23
Attending: INTERNAL MEDICINE
Payer: MEDICARE

## 2023-05-23 DIAGNOSIS — N50.82 SCROTAL PAIN: ICD-10-CM

## 2023-05-23 PROCEDURE — 76870 US EXAM SCROTUM: CPT | Performed by: INTERNAL MEDICINE

## 2023-05-23 PROCEDURE — 93975 VASCULAR STUDY: CPT | Performed by: INTERNAL MEDICINE

## 2023-08-23 ENCOUNTER — TELEPHONE (OUTPATIENT)
Facility: CLINIC | Age: 73
End: 2023-08-23

## 2023-08-23 DIAGNOSIS — Z12.11 SPECIAL SCREENING FOR MALIGNANT NEOPLASMS, COLON: Primary | ICD-10-CM

## 2023-08-25 RX ORDER — ACETAMINOPHEN 160 MG
4000 TABLET,DISINTEGRATING ORAL DAILY
COMMUNITY

## 2023-08-25 RX ORDER — ASCORBIC ACID 500 MG
500 TABLET ORAL DAILY
COMMUNITY

## 2023-08-25 RX ORDER — SODIUM, POTASSIUM,MAG SULFATES 17.5-3.13G
SOLUTION, RECONSTITUTED, ORAL ORAL
Qty: 1 EACH | Refills: 0 | Status: SHIPPED | OUTPATIENT
Start: 2023-08-25

## 2023-08-25 NOTE — TELEPHONE ENCOUNTER
May schedule screening colonoscopy following a split dose Suprep and either IV sedation or monitored anesthesia care per scheduling.

## 2023-08-28 NOTE — TELEPHONE ENCOUNTER
Scheduled for:  Colonoscopy [place]  Provider Name:  Dr. Mychal Buitrago  Date:  10/10/2023  Location:  [facility]  Sedation:  MAC  Time:  3:15pm, (pt is aware to arrive at 2:15pm)   Prep:  Suprep  Meds/Allergies Reconciled?:      Diagnosis with codes:  Colon Cancer Screen Z12.11  Was patient informed to call insurance with codes (Y/N):  Yes, I confirmed MEDICARE insurance with this patient. Referral sent?:  Referral was sent at the time of electronic surgical scheduling. [address] or [facility]  notified?:  I sent an electronic request to Endo Scheduling and received a confirmation today.       Medication Orders:  n/a  Misc Orders:  n/a     Further instructions given by staff:   I discussed the prep instructions with the patient which he verbally understood and is aware that I will send the instructions via [employer]

## 2023-08-28 NOTE — TELEPHONE ENCOUNTER
Called and spoke to the patient, /name verified. The patient called to make some corrections on his home medications.

## 2023-09-20 ENCOUNTER — OFFICE VISIT (OUTPATIENT)
Dept: DERMATOLOGY CLINIC | Facility: CLINIC | Age: 73
End: 2023-09-20

## 2023-09-20 DIAGNOSIS — L30.9 DERMATITIS: ICD-10-CM

## 2023-09-20 DIAGNOSIS — D23.9 BENIGN NEOPLASM OF SKIN, UNSPECIFIED LOCATION: ICD-10-CM

## 2023-09-20 DIAGNOSIS — L82.1 SEBORRHEIC KERATOSES: Primary | ICD-10-CM

## 2023-09-20 DIAGNOSIS — L21.9 SEBORRHEIC DERMATITIS: ICD-10-CM

## 2023-09-20 DIAGNOSIS — D22.9 MULTIPLE NEVI: ICD-10-CM

## 2023-09-20 DIAGNOSIS — Z51.81 MEDICATION MONITORING ENCOUNTER: ICD-10-CM

## 2023-09-20 DIAGNOSIS — L71.9 ROSACEA: ICD-10-CM

## 2023-09-20 PROCEDURE — 99213 OFFICE O/P EST LOW 20 MIN: CPT | Performed by: DERMATOLOGY

## 2023-09-20 PROCEDURE — 17000 DESTRUCT PREMALG LESION: CPT | Performed by: DERMATOLOGY

## 2023-09-20 RX ORDER — METFORMIN HYDROCHLORIDE 500 MG/1
500 TABLET, EXTENDED RELEASE ORAL
COMMUNITY
Start: 2023-09-18

## 2023-10-01 NOTE — PROGRESS NOTES
Cherlyn Phalen is a 68year old male. HPI:     CC:  Patient presents with:  Actinic Keratosis: LOV 23. Pt has hx of Aks and dermatis. Pt present today f/u on face and scalp. Pt denies any new spots of concern. Allergies:  Patient has no known allergies. HISTORY:    Past Medical History:   Diagnosis Date    Allergic rhinitis     CAD (coronary artery disease) 12/10/2014    Ptca/vinayak '04     H/O cardiovascular stress test     EKG stress test    HTN (hypertension) 12/10/2014    Obesity     Other and unspecified hyperlipidemia     S/P coronary artery stent placement     Unspecified essential hypertension     V tach ([address]       Past Surgical History:   Procedure Laterality Date    ANGIOGRAM      per [employer]:  right foot surger     COLONOSCOPY      FOOT/TOES SURGERY PROC UNLISTED      LEG/ANKLE SURGERY PROC UNLISTED      fixed arch      Family History   Problem Relation Age of Onset    Kidney Disease Father         renal disease    Heart Disease Mother         CAD      Social History     Socioeconomic History    Marital status:    Tobacco Use    Smoking status: Former     Types: Cigarettes     Quit date: 12/10/2004     Years since quittin.8    Smokeless tobacco: Never   Vaping Use    Vaping Use: Never used   Substance and Sexual Activity    Alcohol use: Yes     Alcohol/week: 2.0 standard drinks of alcohol     Types: 2 Standard drinks or equivalent per week     Comment: seldom    Drug use: No    Sexual activity: Yes   Other Topics Concern    Pt has a pacemaker No    Pt has a defibrillator No    Reaction to local anesthetic No    Caffeine Concern Yes     Comment: soda; 36 oz. Current Outpatient Medications   Medication Sig Dispense Refill    metFORMIN  MG Oral Tablet 24 Hr Take 1 tablet (500 mg total) by mouth daily with breakfast.      Vitamin C 500 MG Oral Tab Take 1 tablet (500 mg total) by mouth daily.       cholecalciferol 50 MCG ( UT) Oral Cap Take 2 capsules (4,000 Units total) by mouth daily. Na Sulfate-K Sulfate-Mg Sulf (SUPREP BOWEL PREP KIT) 17.5-3.13-1.6 GM/177ML Oral Solution Take as directed 1 each 0    aspirin (ASPIRIN 81) 81 MG Oral Chew Tab Chew 1 tablet (81 mg total) by mouth daily. topiramate 25 MG Oral Tab Take 1 tablet (25 mg total) by mouth 2 (two) times daily. SIMVASTATIN 80 MG Oral Tab Take 1 tablet (80 mg total) by mouth nightly  90 tablet 3    METOPROLOL SUCCINATE 50 MG Oral Tablet 24 Hr TAKE ONE TABLET BY MOUTH ONE TIME DAILY  90 tablet 3    aspirin 81 MG Oral Tab Take 1 tablet (81 mg total) by mouth daily. erythromycin 5 MG/GM Ophthalmic Ointment Apply 1/4 in ribbon to left lower conjunctival sac and to outer corner of eye three times daily for 7 days (Patient not taking: Reported on 4/19/2023) 1 g 0    KETOCONAZOLE 2 % External Shampoo APPLY TO SCALP TWO TIMES A WEEK (Patient not taking: Reported on 12/12/2022) 120 mL 0    FLUOROURACIL 5 % External Cream Use twice weekly at night as directed x 6 weeks (Patient not taking: Reported on 12/12/2022) 40 g 1    metRONIDAZOLE 0.75 % External Cream Use bid to face (Patient not taking: Reported on 12/12/2022) 60 g 6    triamcinolone acetonide 0.1 % External Cream Apply topically 2 (two) times daily.  Apply bid (Patient not taking: Reported on 4/19/2023) 454 g 2     Allergies:   No Known Allergies    Past Medical History:   Diagnosis Date    Allergic rhinitis     CAD (coronary artery disease) 12/10/2014    Ptca/vinayak '04     H/O cardiovascular stress test     EKG stress test    HTN (hypertension) 12/10/2014    Obesity     Other and unspecified hyperlipidemia     S/P coronary artery stent placement 2004    Unspecified essential hypertension     V tach ([address]      Past Surgical History:   Procedure Laterality Date    ANGIOGRAM  1997    per [facility]:  right foot surger 2008    COLONOSCOPY  2010    FOOT/TOES SURGERY PROC UNLISTED      LEG/ANKLE SURGERY PROC UNLISTED      fixed arch Social History    Socioeconomic History      Marital status:       Spouse name: Not on file      Number of children: Not on file      Years of education: Not on file      Highest education level: Not on file    Occupational History      Not on file    Tobacco Use      Smoking status: Former        Types: Cigarettes        Quit date: 12/10/2004        Years since quittin.8      Smokeless tobacco: Never    Vaping Use      Vaping Use: Never used    Substance and Sexual Activity      Alcohol use: Yes        Alcohol/week: 2.0 standard drinks of alcohol        Types: 2 Standard drinks or equivalent per week        Comment: seldom      Drug use: No      Sexual activity: Yes    Other Topics      Concerns:        Grew up on a farm: Not Asked        History of tanning: Not Asked        Outdoor occupation: Not Asked        Pt has a pacemaker: No        Pt has a defibrillator: No        Reaction to local anesthetic: No         Service: Not Asked        Blood Transfusions: Not Asked        Caffeine Concern: Yes          soda; 36 oz. Occupational Exposure: Not Asked        Hobby Hazards: Not Asked        Sleep Concern: Not Asked        Stress Concern: Not Asked        Weight Concern: Not Asked        Special Diet: Not Asked        Back Care: Not Asked        Exercise: Not Asked        Bike Helmet: Not Asked        Seat Belt: Not Asked        Self-Exams: Not Asked    Social History Narrative      Not on file    Social Determinants of Health  Financial Resource Strain: Not on file  Food Insecurity: Not on file  Transportation Needs: Not on file  Physical Activity: Not on file  Stress: Not on file  Social Connections: Not on file  Housing Stability: Not on file  Family History   Problem Relation Age of Onset    Kidney Disease Father         renal disease    Heart Disease Mother         CAD       There were no vitals filed for this visit. HPI:  Patient presents with:  Actinic Keratosis: LOV 23. Pt has hx of Aks and dermatis. Pt present today f/u on face and scalp. Pt denies any new spots of concern. Past notes/ records and appropriate/relevant lab results including pathology and past body maps reviewed. Updated and new information noted in current visit. Follow-up actinic keratoses, dermatitis. continues to use fluorouracil intermittently previously side effect of headaches and side effects over bigger areas. As tolerated to spots concerned with several areas around the hands. Still does have triamcinolone at home    Generally has been pretty careful sun protection. Has been some scaling on the chest  Patient presents with concerns above. Rosacea flaring intermittently and more papules redness. Using metronidazole. Patient has been in their usual state of health. History, medications, allergies reviewed as noted. ROS:  Denies any other systemic complaints. No new or changeing lesions other than noted above. No fevers, chills, night sweats, unusual sun sensitivity. No other skin complaints. History, medications, allergies reviewed as noted. Physical Examination:     Well-developed well-nourished patient alert oriented in no acute distress. Exam performed, including scalp, head, neck, face,nails, hair, external eyes, including conjunctival mucosa, eyelids, lips external ears , arms, digits,palms. Multiple light to medium brown, well marginated, uniformly pigmented, macules and papules 6 mm and less scattered on exam. pigmented lesions examined with dermoscopy benign-appearing patterns. Waxy tannish keratotic papules scattered, cherry-red vascular papules scattered. See map today's date for lesions noted . See assessment and plan below for specific lesions. Background erythema papules pustules    Complaining of itchy area at back eczematous changes erythema noted no suspicious lesions  Otherwise remarkable for lesions as noted on map.       Assessment / plan:    No orders of the defined types were placed in this encounter. Meds & Refills for this Visit:  Requested Prescriptions      No prescriptions requested or ordered in this encounter         Seborrheic keratoses  (primary encounter diagnosis)  Multiple nevi  Benign neoplasm of skin, unspecified location  Seborrheic dermatitis  Medication monitoring encounter  Dermatitis  Rosacea  . Erythematous scaling keratotic papules noted at sites noted on map  Actinic Keratoses. Precancerous nature discussed. Sun protection, sunscreen/ blocks encouraged Lesions treated with cryo- . Biopsy if not resolved. Left parietal scalp. Repeat course to parietal scalp forehead left temple    Scaling area at right cheek more consistent with seborrheic dermatitis triamcinolone to this area    Consider repeat course to isolated spots with 5-FU. Has been able to tolerate this use and isolated lesions on forehead temple possibly right cheek although as above this appears to be more suggestive of seborrheic dermatitis may use ketoconazole in this area     consider alternative topical in the fall. Continue careful sun protection. Overall is doing pretty well. No other significant changes in exam  SKs over the anterior scalp    Right ear okay continue monitoring. Over the anterior scalp and vertex few scattered lesions. Continue careful sun protection overall AK's on the arms and hands stable again consider repeat course of Efudex    Subdermal as noted more prominent on face  Improved seborrheic dermatitis scalp temples chest at central chest we will add Nizoral shampoo to this area seborrhea flaring on the beard area will use triamcinolone, ketoconazole cream continue current regimen overall doing well  Pathophysiology discussed with patient. Therapeutic options reviewed. See  Medications in grid. Instructions reviewed at length. Continue current regimen      Rosacea. .Rosacea.   Stable continue topical metronidazole has not been using this as consistently use daily over the central face meds in grid. Skin care instructions reviewed. Pathophysiology reviewed. Chronic recurrent nature discussed. Patient will let us know how they are doing over the next several weeks. Await clinical response to above therapy. Continue metronidazole, Nizoral.  Consider doxy if worsening  Okay presently patient under more stress will monitor patient's wife with recent diagnosis of hydrocephalus problems with her shunt. Seborrheic dermatitis Nizoral, path ophysiology over-the-counter dandruff shampoos discussed. Overall doing fairly well  Improved overall more diffuse dermatitis over the arms hands chest back add Zyrtec due to twice daily. Continue triamcinolone as needed does have this at home await response if not helping he will let us know. We will plan routine follow-up in 6 months    Waxy tan plaques at temples, parietal scalp. Reassurance given regarding benign keratoses. Eczema, notalgia paresthetica upper back stable. Hand dermatitis triamcinolone as needed. Continue regular follow-ups we will plan recheck in 6 months or as needed    Recheck 4 to 6 months or as needed  Please refer to map for specific lesions. See additional diagnoses. Pros cons of various therapies, risks benefits discussed. Pathophysiology discussed with patient. Therapeutic options reviewed. See  Medications in grid. Instructions reviewed at length. Benign nevi, seborrheic  keratoses, cherry angiomas:  Reassurance regarding other benign skin lesions. Signs and symptoms of skin cancer, ABCDE's of melanoma discussed with patient. Sunscreen use, sun protection, self exams reviewed. Followup as noted RTC ---routine checkup    6 mos -one year or p.r.n.     Encounter Times  Including precharting, reviewing chart, prior notes obtaining history: 5 minutes, medical exam :10 minutes, notes on body map, plan, counseling 10minutes My total time spent caring for the patient on the day of the encounter: 25 minutes     The patient indicates understanding of these issues and agrees to the plan. The patient is asked to return as noted in follow-up/ above. This note was generated using Dragon voice recognition software. Please contact me regarding any confusion resulting from errors in recognition.

## 2023-10-03 ENCOUNTER — TELEPHONE (OUTPATIENT)
Facility: CLINIC | Age: 73
End: 2023-10-03

## 2023-10-03 NOTE — TELEPHONE ENCOUNTER
Called and spoke to the patient, /name verified. The patient was instructed to hold metformin the day before and day of procedure. He voiced understanding of instruction given.

## 2023-10-03 NOTE — TELEPHONE ENCOUNTER
Dr Tony Snider    Pt is scheduled for a colonoscopy on 10/10/2023    Pt started taking metformin about 2 weeks ago as part of a weight loss regimen (500 mg q day)    Pt asking how to take metformin prior to colonoscopy    I told him to hold it the day before and the morning of the procedure but wanted to verify this with you

## 2023-10-10 ENCOUNTER — HOSPITAL ENCOUNTER (OUTPATIENT)
Facility: HOSPITAL | Age: 73
Setting detail: HOSPITAL OUTPATIENT SURGERY
Discharge: HOME | End: 2023-10-10
Attending: INTERNAL MEDICINE | Admitting: INTERNAL MEDICINE
Payer: MEDICARE

## 2023-10-10 ENCOUNTER — ANESTHESIA EVENT (OUTPATIENT)
Dept: ENDOSCOPY | Facility: HOSPITAL | Age: 73
End: 2023-10-10
Payer: MEDICARE

## 2023-10-10 ENCOUNTER — ANESTHESIA (OUTPATIENT)
Dept: ENDOSCOPY | Facility: HOSPITAL | Age: 73
End: 2023-10-10
Payer: MEDICARE

## 2023-10-10 VITALS
HEART RATE: 59 BPM | WEIGHT: 215 LBS | DIASTOLIC BLOOD PRESSURE: 81 MMHG | SYSTOLIC BLOOD PRESSURE: 117 MMHG | OXYGEN SATURATION: 96 % | BODY MASS INDEX: 32.58 KG/M2 | HEIGHT: 68 IN | RESPIRATION RATE: 16 BRPM

## 2023-10-10 DIAGNOSIS — Z12.11 SPECIAL SCREENING FOR MALIGNANT NEOPLASMS, COLON: ICD-10-CM

## 2023-10-10 PROCEDURE — 45385 COLONOSCOPY W/LESION REMOVAL: CPT | Performed by: INTERNAL MEDICINE

## 2023-10-10 PROCEDURE — 0DBH8ZX EXCISION OF CECUM, VIA NATURAL OR ARTIFICIAL OPENING ENDOSCOPIC, DIAGNOSTIC: ICD-10-PCS | Performed by: INTERNAL MEDICINE

## 2023-10-10 PROCEDURE — 0DBM8ZX EXCISION OF DESCENDING COLON, VIA NATURAL OR ARTIFICIAL OPENING ENDOSCOPIC, DIAGNOSTIC: ICD-10-PCS | Performed by: INTERNAL MEDICINE

## 2023-10-10 PROCEDURE — 0DBL8ZX EXCISION OF TRANSVERSE COLON, VIA NATURAL OR ARTIFICIAL OPENING ENDOSCOPIC, DIAGNOSTIC: ICD-10-PCS | Performed by: INTERNAL MEDICINE

## 2023-10-10 RX ORDER — NICOTINE POLACRILEX 4 MG
30 LOZENGE BUCCAL
Status: DISCONTINUED | OUTPATIENT
Start: 2023-10-10 | End: 2023-10-10

## 2023-10-10 RX ORDER — NALOXONE HYDROCHLORIDE 0.4 MG/ML
80 INJECTION, SOLUTION INTRAMUSCULAR; INTRAVENOUS; SUBCUTANEOUS AS NEEDED
Status: DISCONTINUED | OUTPATIENT
Start: 2023-10-10 | End: 2023-10-10

## 2023-10-10 RX ORDER — SODIUM CHLORIDE, SODIUM LACTATE, POTASSIUM CHLORIDE, CALCIUM CHLORIDE 600; 310; 30; 20 MG/100ML; MG/100ML; MG/100ML; MG/100ML
INJECTION, SOLUTION INTRAVENOUS CONTINUOUS
Status: DISCONTINUED | OUTPATIENT
Start: 2023-10-10 | End: 2023-10-10

## 2023-10-10 RX ORDER — LIDOCAINE HYDROCHLORIDE 10 MG/ML
INJECTION, SOLUTION EPIDURAL; INFILTRATION; INTRACAUDAL; PERINEURAL AS NEEDED
Status: DISCONTINUED | OUTPATIENT
Start: 2023-10-10 | End: 2023-10-10 | Stop reason: SURG

## 2023-10-10 RX ORDER — NICOTINE POLACRILEX 4 MG
15 LOZENGE BUCCAL
Status: DISCONTINUED | OUTPATIENT
Start: 2023-10-10 | End: 2023-10-10

## 2023-10-10 RX ORDER — DEXTROSE MONOHYDRATE 25 G/50ML
50 INJECTION, SOLUTION INTRAVENOUS
Status: DISCONTINUED | OUTPATIENT
Start: 2023-10-10 | End: 2023-10-10

## 2023-10-10 RX ADMIN — LIDOCAINE HYDROCHLORIDE 20 MG: 10 INJECTION, SOLUTION EPIDURAL; INFILTRATION; INTRACAUDAL; PERINEURAL at 15:27:00

## 2023-10-10 RX ADMIN — SODIUM CHLORIDE, SODIUM LACTATE, POTASSIUM CHLORIDE, CALCIUM CHLORIDE: 600; 310; 30; 20 INJECTION, SOLUTION INTRAVENOUS at 15:27:00

## 2023-10-10 NOTE — H&P
History & Physical Examination    Patient Name: Alfreda Singh  MRN: [id number]  CSN: [id number]  YOB: 1950    Diagnosis: Colorectal cancer screening      metFORMIN  MG Oral Tablet 24 Hr, Take 1 tablet (500 mg total) by mouth daily with breakfast., Disp: , Rfl: , 10/8/2023  Vitamin C 500 MG Oral Tab, Take 1 tablet (500 mg total) by mouth daily. , Disp: , Rfl: , 10/3/2023  cholecalciferol 50 MCG (2000 UT) Oral Cap, Take 2 capsules (4,000 Units total) by mouth daily. , Disp: , Rfl: , 10/3/2023  aspirin (ASPIRIN 81) 81 MG Oral Chew Tab, Chew 1 tablet (81 mg total) by mouth daily. , Disp: , Rfl:   topiramate 25 MG Oral Tab, Take 1 tablet (25 mg total) by mouth 2 (two) times daily. , Disp: , Rfl: , 10/8/2023  SIMVASTATIN 80 MG Oral Tab, Take 1 tablet (80 mg total) by mouth nightly , Disp: 90 tablet, Rfl: 3, 10/8/2023  METOPROLOL SUCCINATE 50 MG Oral Tablet 24 Hr, TAKE ONE TABLET BY MOUTH ONE TIME DAILY , Disp: 90 tablet, Rfl: 3, 10/8/2023  Na Sulfate-K Sulfate-Mg Sulf (SUPREP BOWEL PREP KIT) 17.5-3.13-1.6 GM/177ML Oral Solution, Take as directed, Disp: 1 each, Rfl: 0  erythromycin 5 MG/GM Ophthalmic Ointment, Apply 1/4 in ribbon to left lower conjunctival sac and to outer corner of eye three times daily for 7 days (Patient not taking: Reported on 4/19/2023), Disp: 1 g, Rfl: 0  KETOCONAZOLE 2 % External Shampoo, APPLY TO SCALP TWO TIMES A WEEK (Patient not taking: Reported on 12/12/2022), Disp: 120 mL, Rfl: 0  FLUOROURACIL 5 % External Cream, Use twice weekly at night as directed x 6 weeks (Patient not taking: Reported on 12/12/2022), Disp: 40 g, Rfl: 1  metRONIDAZOLE 0.75 % External Cream, Use bid to face (Patient not taking: Reported on 12/12/2022), Disp: 60 g, Rfl: 6  triamcinolone acetonide 0.1 % External Cream, Apply topically 2 (two) times daily. Apply bid (Patient not taking: Reported on 4/19/2023), Disp: 454 g, Rfl: 2  aspirin 81 MG Oral Tab, Take 1 tablet (81 mg total) by mouth daily. , Disp: , Rfl: , 10/8/2023      lactated ringers infusion, , Intravenous, Continuous        Allergies: No Known Allergies    Past Medical History:   Diagnosis Date    Allergic rhinitis     CAD (coronary artery disease) 12/10/2014    Ptca/vinayak '04     H/O cardiovascular stress test     EKG stress test    High blood pressure     High cholesterol     HTN (hypertension) 12/10/2014    Obesity     Other and unspecified hyperlipidemia     S/P coronary artery stent placement     Unspecified essential hypertension     V tach ([address]      Past Surgical History:   Procedure Laterality Date    ANGIOGRAM      per [facility]:  right foot surger     COLONOSCOPY      FOOT/TOES SURGERY PROC UNLISTED      LEG/ANKLE SURGERY PROC UNLISTED      fixed arch     Family History   Problem Relation Age of Onset    Kidney Disease Father         renal disease    Heart Disease Mother         CAD     Social History    Tobacco Use      Smoking status: Former        Types: Cigarettes        Quit date: 12/10/2004        Years since quittin.8      Smokeless tobacco: Never    Alcohol use: Yes      Alcohol/week: 2.0 standard drinks of alcohol      Types: 2 Standard drinks or equivalent per week      Comment: seldom      SYSTEM Check if Review is Normal Check if Physical Exam is Normal If not normal, please explain:   HEENT [email or web address].[email or web address] ] [ Ilean Child  [email or web address].[email or web address] ] [ Delmon Sauger Pelagia.Ebbing ] [ True Lesser Pelagia.Ebbing ] [ Sven Bohannon Pelagia.Ebbing ] [ Marzella Dove Pelagia.Ebbing ] [ Davidson So        [ x ] I have discussed the risks and benefits and alternatives with the patient/family. They understand and agree to proceed with plan of care. [ x ] I have reviewed the History and Physical done within the last 30 days. Any changes noted above.     Ilana Reyes MD  10/10/2023  3:26 PM

## 2023-10-10 NOTE — OPERATIVE REPORT
[address] Endoscopy Report      Date of Procedure:  10/10/23      Preoperative Diagnosis:  Colorectal cancer screening      Postoperative Diagnosis:  1. Diminutive colon polyps  2. Pancolonic diverticulosis      Procedure:    Colonoscopy with polypectomy      Surgeon:  Fausto Jackman M.D. Anesthesia:  Monitored anesthesia care  Cecal withdrawal time: 26 minutes  EBL:  Insignificant      Brief History: This is a 68year old male who presents for a screening colonoscopy. His last examination was greater than 10 years prior. He has had no lower gastrointestinal tract symptoms or signs. Technique:  After informed consent, the patient was placed in the left lateral recumbent position. Digital rectal examination revealed no palpable intraluminal abnormalities. An Olympus variable stiffness 190 series HD colonoscope was inserted into the rectum and advanced under direct vision by following the lumen to the terminal ileum. The colon was examined upon withdrawal in the left lateral recumbent position. Findings:  The preparation of the colon was good. The terminal ileum was examined for 5 cm and visually normal.  The ileocecal valve was well preserved. The visualized colonic mucosa from the cecum to the anal verge was normal with an intact vascular pattern. There were #5 polyps seen within the colon which removed as follows:    1. In the cecum there was a 3 mm sessile polyp which was cold snare excised and retrieved. 2.  In the transverse colon there were #3 3 mm sessile polyps which were cold snare excised and retrieved. 4.  In the descending colon there was a 4 mm sessile polyp which was cold snare excised and retrieved. Inspection of all sites revealed no evidence of ongoing bleeding. There were diverticula seen throughout the colon most numerous in the sigmoid without current signs of complication.   There were no other colonic polyps, mass lesions, vascular anomalies or signs of inflammation seen. Retroflexion in the rectum revealed no abnormalities. The procedure was well tolerated without immediate complication. Impression:  1. Diminutive colon polyps  2. Uncomplicated pancolonic diverticulosis    Recommendations:  1. High-fiber diet. 2.  Follow-up biopsy results. Polyp histology to determine recommendations regarding follow-up.         Adeel Vitale MD  10/10/2023

## 2023-10-10 NOTE — DISCHARGE INSTRUCTIONS

## 2023-10-11 NOTE — ANESTHESIA POSTPROCEDURE EVALUATION
Patient: Mateo Mijares    Procedure Summary       Date: 10/10/23 Room / Location: [address] [place] [address] ENDOSCOPY 04 / [address] [place] [address] ENDOSCOPY    Anesthesia Start: 6367 Anesthesia Stop: 4336    Procedure: COLONOSCOPY Diagnosis:       Special screening for malignant neoplasms, colon      (polyps, diverticulosis)    Surgeons: Ana Fierro MD Anesthesiologist: Pham Carr MD    Anesthesia Type: MAC ASA Status: 2            Anesthesia Type: MAC    Vitals Value Taken Time   /76 10/10/23 1635        Pulse 48 10/10/23 1635   Resp 15 10/10/23 1635   SpO2 96 % 10/10/23 1630   Vitals shown include unfiled device data.     [address] [place] [address] AN Post Evaluation:   Patient Evaluated in PACU  Patient Participation: complete - patient participated  Level of Consciousness: awake and alert  Pain Management: adequate  Airway Patency:patent  Dental exam unchanged from preop  Yes    Cardiovascular Status: acceptable  Respiratory Status: acceptable  Postoperative Hydration acceptable      Samir Molina MD  10/11/2023 7:46 AM

## 2023-10-14 ENCOUNTER — TELEPHONE (OUTPATIENT)
Facility: CLINIC | Age: 73
End: 2023-10-14

## 2023-10-14 NOTE — TELEPHONE ENCOUNTER
Endoscopy and pathology reports with MD comments from result notes faxed to Dr Cristobal Hallost ([phone]) ([phone])

## 2023-10-14 NOTE — TELEPHONE ENCOUNTER
Health maintenance updated. Last colonoscopy done 10/10/2023 by Dr Alondra Warner    Recall placed into Pt Outreach, next due on 10/2028 per Stathopoulos.

## 2023-11-28 RX ORDER — TRIAMCINOLONE ACETONIDE 1 MG/G
1 CREAM TOPICAL 2 TIMES DAILY
Qty: 454 G | Refills: 0 | Status: SHIPPED | OUTPATIENT
Start: 2023-11-28

## 2023-11-28 NOTE — TELEPHONE ENCOUNTER
Spoke with pt and he states the tub of triamcinolone he has is all dried out and he needs new one. Pt states he is using the one from 2016. Pt states he is getting a little better but the old tub is not effective. Ok to refill? Thank you.

## 2023-11-28 NOTE — TELEPHONE ENCOUNTER
Refill Request for medication(s):     Last Office Visit: 9/20/23    Last Refill: 1/4/2017    Pharmacy, Dosage verified: RX from 2017? Msg to pt sent     Condition Update (if applicable):     Rx pended and sent to provider for approval, please advise. Thank You!

## 2024-01-22 ENCOUNTER — OFFICE VISIT (OUTPATIENT)
Dept: DERMATOLOGY CLINIC | Facility: CLINIC | Age: 74
End: 2024-01-22

## 2024-01-22 DIAGNOSIS — D48.5 NEOPLASM OF UNCERTAIN BEHAVIOR OF SKIN: Primary | ICD-10-CM

## 2024-01-22 PROCEDURE — 88305 TISSUE EXAM BY PATHOLOGIST: CPT | Performed by: DERMATOLOGY

## 2024-01-25 NOTE — PROGRESS NOTES
The pathology report from last visit showed  right inferior forehead, shave biopsy:  -Hypertrophic actinic keratosis., no invasive cancer  .  Please log in test results.  Please call patient and inform of results and recommendations.  (please add to history).  Pt to  rtc   4/24 as scheduled  or prn.

## 2024-01-28 NOTE — PROGRESS NOTES
Operative Report                     Shave/  Tangential biopsy     Clinical diagnosis:    Size of lesion:    Location:Pt with horn-like plaque 1.1cm on erythematous base, r/o SCC at right anterior forehead,     Procedure:    With patient in appropriate position the skin of the above was scrubbed with alcohol.  Anesthesia was obtained with 1% Xylocaine with epinephrine.  The skin surrounding the lesion was placed under tension and the lesion was incised using a #15 scalpel blade.  The specimen was sent for histopathologic exam.    Hemostasis was obtained with electrocautery/aluminum chloride.  Estimated blood loss less than 2 cc.    Biopsy dressed with Polysporin, bandage.    Pressure dressing:   No    Complications: None    Written instructions given and reviewed with patient    Await pathology    Contact information reviewed.    Procedural physician:  Oumou Shook MD

## 2024-01-28 NOTE — PROGRESS NOTES
Marlon Clark is a 73 year old male.  HPI:     CC:    Chief Complaint   Patient presents with    Lesion     Hx of Aks. LOV 2023.  Pt presents for f/u on Aks to the scalp.  Was told to use FLUOROURACIL on this areas once healed, areas have not healed.  Pt has not applied yet, would like your recommendations. Is using triamcinolone as needed for dry skin.          Allergies:  Patient has no known allergies.    HISTORY:    Past Medical History:   Diagnosis Date    Actinic keratosis     right inferior forehead    Allergic rhinitis     CAD (coronary artery disease) 12/10/2014    Ptca/vinayak '04     H/O cardiovascular stress test     EKG stress test    High blood pressure     High cholesterol     HTN (hypertension) 12/10/2014    Obesity     Other and unspecified hyperlipidemia     S/P coronary artery stent placement     Unspecified essential hypertension     V tach (HCC)       Past Surgical History:   Procedure Laterality Date    ANGIOGRAM      per [employer]:  right foot surger     COLONOSCOPY      COLONOSCOPY N/A 10/10/2023    Procedure: COLONOSCOPY;  Surgeon: Keith Power MD;  Location: [facility] ENDOSCOPY    FOOT/TOES SURGERY PROC UNLISTED      LEG/ANKLE SURGERY PROC UNLISTED      fixed arch      Family History   Problem Relation Age of Onset    Kidney Disease Father         renal disease    Heart Disease Mother         CAD      Social History     Socioeconomic History    Marital status:    Tobacco Use    Smoking status: Former     Types: Cigarettes     Quit date: 12/10/2004     Years since quittin.1    Smokeless tobacco: Never   Vaping Use    Vaping Use: Never used   Substance and Sexual Activity    Alcohol use: Yes     Alcohol/week: 2.0 standard drinks of alcohol     Types: 2 Standard drinks or equivalent per week     Comment: seldom    Drug use: No    Sexual activity: Yes   Other Topics Concern    Grew up on a farm No    History of tanning Yes    Outdoor occupation No    Pt has  a pacemaker No    Pt has a defibrillator No    Reaction to local anesthetic No    Caffeine Concern Yes     Comment: soda; 36 oz.        Current Outpatient Medications   Medication Sig Dispense Refill    cholecalciferol 1.25 MG (59132 UT) Oral Cap Take 2 tablets by mouth daily.      triamcinolone 0.1 % External Cream Apply 1 Application topically 2 (two) times daily. 454 g 0    metFORMIN  MG Oral Tablet 24 Hr Take 1 tablet (500 mg total) by mouth daily with breakfast.      Vitamin C 500 MG Oral Tab Take 1 tablet (500 mg total) by mouth daily.      cholecalciferol 50 MCG (2000 UT) Oral Cap Take 2 capsules (4,000 Units total) by mouth daily.      Na Sulfate-K Sulfate-Mg Sulf (SUPREP BOWEL PREP KIT) 17.5-3.13-1.6 GM/177ML Oral Solution Take as directed 1 each 0    aspirin (ASPIRIN 81) 81 MG Oral Chew Tab Chew 1 tablet (81 mg total) by mouth daily.      topiramate 25 MG Oral Tab Take 1 tablet (25 mg total) by mouth 2 (two) times daily.      SIMVASTATIN 80 MG Oral Tab Take 1 tablet (80 mg total) by mouth nightly  90 tablet 3    METOPROLOL SUCCINATE 50 MG Oral Tablet 24 Hr TAKE ONE TABLET BY MOUTH ONE TIME DAILY  90 tablet 3    aspirin 81 MG Oral Tab Take 1 tablet (81 mg total) by mouth daily.      erythromycin 5 MG/GM Ophthalmic Ointment Apply 1/4 in ribbon to left lower conjunctival sac and to outer corner of eye three times daily for 7 days (Patient not taking: Reported on 4/19/2023) 1 g 0    KETOCONAZOLE 2 % External Shampoo APPLY TO SCALP TWO TIMES A WEEK (Patient not taking: Reported on 12/12/2022) 120 mL 0    FLUOROURACIL 5 % External Cream Use twice weekly at night as directed x 6 weeks (Patient not taking: Reported on 12/12/2022) 40 g 1    metRONIDAZOLE 0.75 % External Cream Use bid to face (Patient not taking: Reported on 12/12/2022) 60 g 6     Allergies:   No Known Allergies    Past Medical History:   Diagnosis Date    Actinic keratosis 2024    right inferior forehead    Allergic rhinitis     CAD (coronary  artery disease) 12/10/2014    Ptca/vinayak '04     H/O cardiovascular stress test     EKG stress test    High blood pressure     High cholesterol     HTN (hypertension) 12/10/2014    Obesity     Other and unspecified hyperlipidemia     S/P coronary artery stent placement     Unspecified essential hypertension     V tach (HCC)      Past Surgical History:   Procedure Laterality Date    ANGIOGRAM      per [facility]:  right foot surger     COLONOSCOPY      COLONOSCOPY N/A 10/10/2023    Procedure: COLONOSCOPY;  Surgeon: Keith Power MD;  Location: [facility] ENDOSCOPY    FOOT/TOES SURGERY PROC UNLISTED      LEG/ANKLE SURGERY PROC UNLISTED      fixed arch     Social History     Socioeconomic History    Marital status:      Spouse name: Not on file    Number of children: Not on file    Years of education: Not on file    Highest education level: Not on file   Occupational History    Not on file   Tobacco Use    Smoking status: Former     Types: Cigarettes     Quit date: 12/10/2004     Years since quittin.1    Smokeless tobacco: Never   Vaping Use    Vaping Use: Never used   Substance and Sexual Activity    Alcohol use: Yes     Alcohol/week: 2.0 standard drinks of alcohol     Types: 2 Standard drinks or equivalent per week     Comment: seldom    Drug use: No    Sexual activity: Yes   Other Topics Concern    Grew up on a farm No    History of tanning Yes    Outdoor occupation No    Pt has a pacemaker No    Pt has a defibrillator No    Reaction to local anesthetic No     Service Not Asked    Blood Transfusions Not Asked    Caffeine Concern Yes     Comment: soda; 36 oz.    Occupational Exposure Not Asked    Hobby Hazards Not Asked    Sleep Concern Not Asked    Stress Concern Not Asked    Weight Concern Not Asked    Special Diet Not Asked    Back Care Not Asked    Exercise Not Asked    Bike Helmet Not Asked    Seat Belt Not Asked    Self-Exams Not Asked   Social History Narrative    Not on file      Social Determinants of Health     Financial Resource Strain: Not on file   Food Insecurity: Not on file   Transportation Needs: Not on file   Physical Activity: Not on file   Stress: Not on file   Social Connections: Not on file   Housing Stability: Not on file     Family History   Problem Relation Age of Onset    Kidney Disease Father         renal disease    Heart Disease Mother         CAD       There were no vitals filed for this visit.    HPI:  Chief Complaint   Patient presents with    Lesion     Hx of Aks. LOV 9/2023.  Pt presents for f/u on Aks to the scalp.  Was told to use FLUOROURACIL on this areas once healed, areas have not healed.  Pt has not applied yet, would like your recommendations. Is using triamcinolone as needed for dry skin.      Past notes/ records and appropriate/relevant lab results including pathology and past body maps reviewed. Updated and new information noted in current visit.     Has not yet started fluorouracil concerns over areas healing post cryo did take a while for this to resolve  Triamcinolone helpful for dryness  Follow-up actinic keratoses, dermatitis.  continues to use fluorouracil intermittently previously side effect of headaches and side effects over bigger areas.  As tolerated to spots concerned with several areas around the hands.    Still does have triamcinolone at home    Generally has been pretty careful sun protection.  Has been some scaling on the chest  Patient presents with concerns above.  Rosacea flaring intermittently and more papules redness.  Using metronidazole.  Patient has been in their usual state of health.  History, medications, allergies reviewed as noted.      ROS:  Denies any other systemic complaints.  No new or changeing lesions other than noted above. No fevers, chills, night sweats, unusual sun sensitivity.  No other skin complaints.        History, medications, allergies reviewed as noted.       Physical Examination:     Well-developed  well-nourished patient alert oriented in no acute distress.  Exam performed, including scalp, head, neck, face,nails, hair, external eyes, including conjunctival mucosa, eyelids, lips external ears , arms, digits,palms.     Multiple light to medium brown, well marginated, uniformly pigmented, macules and papules 6 mm and less scattered on exam. pigmented lesions examined with dermoscopy benign-appearing patterns.     Waxy tannish keratotic papules scattered, cherry-red vascular papules scattered.    See map today's date for lesions noted .  See assessment and plan below for specific lesions.  Background erythema papules pustules    Complaining of itchy area at back eczematous changes erythema noted no suspicious lesions  Otherwise remarkable for lesions as noted on map.      Assessment / plan:    Orders Placed This Encounter   Procedures    Specimen to Pathology, Tissue [[facility] Pt to [facility] lab]       Meds & Refills for this Visit:  Requested Prescriptions      No prescriptions requested or ordered in this encounter         Encounter Diagnosis   Name Primary?    Neoplasm of uncertain behavior of skin Yes     .  Pt with horn-like plaque 1.1cm on erythematous base, r/o SCC at right anterior forehead,   Shave/ tangential biopsy performed, operative note and consent in chart further plans pending pathology    Actinic keratoses.  Precancerous nature discussed.  Treatment options reviewed at length we will proceed with topical therapy with              twice weekly for   6   week course  of actual medication use and may alternate weeks if necessary.  Increased redness scaling crusting irritation pain tenderness potential discussed.  Anticipate one month for resolution of symptoms.  Plan recheck in one month after completion of treatment course.  Consider alternative treatment biopsy if not resolved.  Patient has not started the fluorouracil from previous recommendation  Reviewed application instructions.  Will use over mid  scalp, anterior scalp left brow left temple, bilateral hands would do area at mid scalp anterior scalp first and then treat hands once course is completed for the scalp.    Continue triamcinolone mazes more frequently and in between if more irritation from the fluorouracil  Await pathology    Scaling area at right cheek more consistent with seborrheic dermatitis triamcinolone to this area    consider alternative topical in the fall.  Continue careful sun protection.  Overall is doing pretty well.  No other significant changes in exam  SKs over the anterior scalp    Right ear okay continue monitoring.    Over the anterior scalp and vertex few scattered lesions.    Continue careful sun protection overall AK's on the arms and hands stable again consider repeat course of Efudex    Subdermal as noted more prominent on face  Improved seborrheic dermatitis scalp temples chest at central chest we will add Nizoral shampoo to this area seborrhea flaring on the beard area will use triamcinolone, ketoconazole cream continue current regimen overall doing well  Pathophysiology discussed with patient.  Therapeutic options reviewed.  See  Medications in grid.  Instructions reviewed at length.  Continue current regimen      Rosacea..Rosacea.  Stable continue topical metronidazole has not been using this as consistently use daily over the central face meds in grid.  Skin care instructions reviewed.  Pathophysiology reviewed.  Chronic recurrent nature discussed.  Patient will let us know how they are doing over the next several weeks.  Await clinical response to above therapy.  Continue metronidazole, Nizoral.  Consider doxy if worsening  Okay presently patient under more stress will monitor patient's wife with recent diagnosis of hydrocephalus problems with her shunt.  Seborrheic dermatitis Nizoral, path ophysiology over-the-counter dandruff shampoos discussed.  Overall doing fairly well  Improved overall more diffuse dermatitis over  the arms hands chest back add Zyrtec due to twice daily.  Continue triamcinolone as needed does have this at home await response if not helping he will let us know.  We will plan routine follow-up in 6 months    Waxy tan plaques at temples, parietal scalp.  Reassurance given regarding benign keratoses.      Eczema, notalgia paresthetica upper back stable.    Hand dermatitis triamcinolone as needed.  Continue regular follow-ups we will plan recheck in 6 months or as needed    Recheck 4 to 6 months or as needed  Please refer to map for specific lesions.  See additional diagnoses.  Pros cons of various therapies, risks benefits discussed.Pathophysiology discussed with patient.  Therapeutic options reviewed.  See  Medications in grid.  Instructions reviewed at length.    Benign nevi, seborrheic  keratoses, cherry angiomas:  Reassurance regarding other benign skin lesions.Signs and symptoms of skin cancer, ABCDE's of melanoma discussed with patient. Sunscreen use, sun protection, self exams reviewed.  Followup as noted RTC ---routine checkup    6 mos -one year or p.r.n.    Encounter Times  Including precharting, reviewing chart, prior notes obtaining history: 5 minutes, medical exam :10 minutes, notes on body map, plan, counseling 10minutes My total time spent caring for the patient on the day of the encounter: 25 minutes     The patient indicates understanding of these issues and agrees to the plan.  The patient is asked to return as noted in follow-up/ above.    This note was generated using Dragon voice recognition software.  Please contact me regarding any confusion resulting from errors in recognition.

## 2024-03-01 ENCOUNTER — HOSPITAL ENCOUNTER (OUTPATIENT)
Dept: ULTRASOUND IMAGING | Facility: HOSPITAL | Age: 74
Discharge: HOME | End: 2024-03-01
Attending: INTERNAL MEDICINE
Payer: MEDICARE

## 2024-03-01 DIAGNOSIS — Z87.891 HISTORY OF TOBACCO USE: ICD-10-CM

## 2024-03-01 PROCEDURE — 76706 US ABDL AORTA SCREEN AAA: CPT | Performed by: INTERNAL MEDICINE

## 2024-04-22 ENCOUNTER — OFFICE VISIT (OUTPATIENT)
Dept: DERMATOLOGY CLINIC | Facility: CLINIC | Age: 74
End: 2024-04-22
Payer: MEDICARE

## 2024-04-22 DIAGNOSIS — D22.9 MULTIPLE NEVI: ICD-10-CM

## 2024-04-22 DIAGNOSIS — L57.0 AK (ACTINIC KERATOSIS): Primary | ICD-10-CM

## 2024-04-22 DIAGNOSIS — Z51.81 MEDICATION MONITORING ENCOUNTER: ICD-10-CM

## 2024-04-22 DIAGNOSIS — L82.1 SEBORRHEIC KERATOSES: ICD-10-CM

## 2024-04-22 DIAGNOSIS — L71.9 ROSACEA: ICD-10-CM

## 2024-04-22 DIAGNOSIS — D23.9 BENIGN NEOPLASM OF SKIN, UNSPECIFIED LOCATION: ICD-10-CM

## 2024-04-22 DIAGNOSIS — L30.9 DERMATITIS: ICD-10-CM

## 2024-04-22 DIAGNOSIS — L21.9 SEBORRHEIC DERMATITIS: ICD-10-CM

## 2024-04-22 PROCEDURE — 99213 OFFICE O/P EST LOW 20 MIN: CPT | Performed by: DERMATOLOGY

## 2024-04-22 PROCEDURE — 17000 DESTRUCT PREMALG LESION: CPT | Performed by: DERMATOLOGY

## 2024-05-05 NOTE — PROGRESS NOTES
Marlon Clark is a 73 year old male.  HPI:     CC:    Chief Complaint   Patient presents with    Actinic Keratosis     LOV 23. Patient present for 3 month AK f/u on his scalp. Denies personal or family Hx of skin cancer. Denies any concerns at this time.         Allergies:  Patient has no known allergies.    HISTORY:    Past Medical History:    Actinic keratosis    right inferior forehead    Allergic rhinitis    CAD (coronary artery disease)    Ptca/vinayak '04     H/O cardiovascular stress test    EKG stress test    High blood pressure    High cholesterol    HTN (hypertension)    Obesity    Other and unspecified hyperlipidemia    S/P coronary artery stent placement    Unspecified essential hypertension    V tach (HCC)      Past Surgical History:   Procedure Laterality Date    Angiogram      per [facility]:  right foot surger     Colonoscopy      Colonoscopy N/A 10/10/2023    Procedure: COLONOSCOPY;  Surgeon: Keith Power MD;  Location: [facility] ENDOSCOPY    Foot/toes surgery proc unlisted      Leg/ankle surgery proc unlisted      fixed arch      Family History   Problem Relation Age of Onset    Kidney Disease Father         renal disease    Heart Disease Mother         CAD      Social History     Socioeconomic History    Marital status:    Tobacco Use    Smoking status: Former     Current packs/day: 0.00     Types: Cigarettes     Quit date: 12/10/2004     Years since quittin.4    Smokeless tobacco: Never   Vaping Use    Vaping status: Never Used   Substance and Sexual Activity    Alcohol use: Yes     Alcohol/week: 2.0 standard drinks of alcohol     Types: 2 Standard drinks or equivalent per week     Comment: seldom    Drug use: No    Sexual activity: Yes   Other Topics Concern    Grew up on a farm No    History of tanning Yes    Outdoor occupation No    Pt has a pacemaker No    Pt has a defibrillator No    Reaction to local anesthetic No    Caffeine Concern Yes     Comment: soda; 36  oz.     Social Determinants of Health     Financial Resource Strain: Low Risk  (5/18/2023)    Received from [facility], [facility]    Overall Financial Resource Strain (CARDIA)     Difficulty of Paying Living Expenses: Not hard at all   Food Insecurity: No Food Insecurity (5/18/2023)    Received from [facility], [facility]    Hunger Vital Sign     Worried About Running Out of Food in the Last Year: Never true     Ran Out of Food in the Last Year: Never true   Transportation Needs: No Transportation Needs (5/18/2023)    Received from [facility], [facility]    PRAPARE - Transportation     Lack of Transportation (Medical): No     Lack of Transportation (Non-Medical): No        Current Outpatient Medications   Medication Sig Dispense Refill    cholecalciferol 1.25 MG (62020 UT) Oral Cap Take 2 tablets by mouth daily.      triamcinolone 0.1 % External Cream Apply 1 Application topically 2 (two) times daily. 454 g 0    metFORMIN  MG Oral Tablet 24 Hr Take 1 tablet (500 mg total) by mouth daily with breakfast.      Vitamin C 500 MG Oral Tab Take 1 tablet (500 mg total) by mouth daily.      cholecalciferol 50 MCG (2000 UT) Oral Cap Take 2 capsules (4,000 Units total) by mouth daily.      Na Sulfate-K Sulfate-Mg Sulf (SUPREP BOWEL PREP KIT) 17.5-3.13-1.6 GM/177ML Oral Solution Take as directed 1 each 0    aspirin (ASPIRIN 81) 81 MG Oral Chew Tab Chew 1 tablet (81 mg total) by mouth daily.      topiramate 25 MG Oral Tab Take 1 tablet (25 mg total) by mouth 2 (two) times daily.      KETOCONAZOLE 2 % External Shampoo APPLY TO SCALP TWO TIMES A WEEK 120 mL 0    SIMVASTATIN 80 MG Oral Tab Take 1 tablet (80 mg total) by mouth nightly  90 tablet 3    METOPROLOL SUCCINATE 50 MG Oral Tablet 24 Hr TAKE ONE TABLET BY MOUTH ONE TIME DAILY  90 tablet 3    FLUOROURACIL 5 % External Cream Use twice  weekly at night as directed x 6 weeks 40 g 1    metRONIDAZOLE 0.75 % External Cream Use bid to face 60 g 6    aspirin 81 MG Oral Tab Take 1 tablet (81 mg total) by mouth daily.      erythromycin 5 MG/GM Ophthalmic Ointment Apply 1/4 in ribbon to left lower conjunctival sac and to outer corner of eye three times daily for 7 days (Patient not taking: Reported on 2023) 1 g 0     Allergies:   No Known Allergies    Past Medical History:    Actinic keratosis    right inferior forehead    Allergic rhinitis    CAD (coronary artery disease)    Ptca/vinayak '04     H/O cardiovascular stress test    EKG stress test    High blood pressure    High cholesterol    HTN (hypertension)    Obesity    Other and unspecified hyperlipidemia    S/P coronary artery stent placement    Unspecified essential hypertension    V tach (HCC)     Past Surgical History:   Procedure Laterality Date    Angiogram      per [facility]:  right foot surger     Colonoscopy      Colonoscopy N/A 10/10/2023    Procedure: COLONOSCOPY;  Surgeon: Keith Power MD;  Location: [facility] ENDOSCOPY    Foot/toes surgery proc unlisted      Leg/ankle surgery proc unlisted      fixed arch     Social History     Socioeconomic History    Marital status:      Spouse name: Not on file    Number of children: Not on file    Years of education: Not on file    Highest education level: Not on file   Occupational History    Not on file   Tobacco Use    Smoking status: Former     Current packs/day: 0.00     Types: Cigarettes     Quit date: 12/10/2004     Years since quittin.4    Smokeless tobacco: Never   Vaping Use    Vaping status: Never Used   Substance and Sexual Activity    Alcohol use: Yes     Alcohol/week: 2.0 standard drinks of alcohol     Types: 2 Standard drinks or equivalent per week     Comment: seldom    Drug use: No    Sexual activity: Yes   Other Topics Concern    Grew up on a farm No    History of tanning Yes    Outdoor occupation No    Pt has  a pacemaker No    Pt has a defibrillator No    Reaction to local anesthetic No     Service Not Asked    Blood Transfusions Not Asked    Caffeine Concern Yes     Comment: soda; 36 oz.    Occupational Exposure Not Asked    Hobby Hazards Not Asked    Sleep Concern Not Asked    Stress Concern Not Asked    Weight Concern Not Asked    Special Diet Not Asked    Back Care Not Asked    Exercise Not Asked    Bike Helmet Not Asked    Seat Belt Not Asked    Self-Exams Not Asked   Social History Narrative    Not on file     Social Determinants of Health     Financial Resource Strain: Low Risk  (5/18/2023)    Received from [facility], [facility]    Overall Financial Resource Strain (CARDIA)     Difficulty of Paying Living Expenses: Not hard at all   Food Insecurity: No Food Insecurity (5/18/2023)    Received from [facility], [facility]    Hunger Vital Sign     Worried About Running Out of Food in the Last Year: Never true     Ran Out of Food in the Last Year: Never true   Transportation Needs: No Transportation Needs (5/18/2023)    Received from [facility], [facility]    PRAPARE - Transportation     Lack of Transportation (Medical): No     Lack of Transportation (Non-Medical): No   Physical Activity: Not on file   Stress: Not on file   Social Connections: Not on file   Housing Stability: Not on file     Family History   Problem Relation Age of Onset    Kidney Disease Father         renal disease    Heart Disease Mother         CAD       There were no vitals filed for this visit.    HPI:  Chief Complaint   Patient presents with    Actinic Keratosis     LOV 4/19/23. Patient present for 3 month AK f/u on his scalp. Denies personal or family Hx of skin cancer. Denies any concerns at this time.     Past notes/ records and appropriate/relevant lab results including pathology and past body maps  reviewed. Updated and new information noted in current visit.     Has not yet started fluorouracil concerns over areas healing post cryo did take a while for this to resolve  Triamcinolone helpful for dryness  Follow-up actinic keratoses, dermatitis.  continues to use fluorouracil intermittently previously side effect of headaches and side effects over bigger areas.  As tolerated to spots concerned with several areas around the hands.    Still does have triamcinolone at home    Generally has been pretty careful sun protection.  Has been some scaling on the chest  Patient presents with concerns above.  Rosacea flaring intermittently and more papules redness.  Using metronidazole.  Patient has been in their usual state of health.  History, medications, allergies reviewed as noted.      ROS:  Denies any other systemic complaints.  No new or changeing lesions other than noted above. No fevers, chills, night sweats, unusual sun sensitivity.  No other skin complaints.        History, medications, allergies reviewed as noted.       Physical Examination:     Well-developed well-nourished patient alert oriented in no acute distress.  Exam performed, including scalp, head, neck, face,nails, hair, external eyes, including conjunctival mucosa, eyelids, lips external ears , arms, digits,palms.     Multiple light to medium brown, well marginated, uniformly pigmented, macules and papules 6 mm and less scattered on exam. pigmented lesions examined with dermoscopy benign-appearing patterns.     Waxy tannish keratotic papules scattered, cherry-red vascular papules scattered.    See map today's date for lesions noted .  See assessment and plan below for specific lesions.  Background erythema papules pustules    Complaining of itchy area at back eczematous changes erythema noted no suspicious lesions  Otherwise remarkable for lesions as noted on map.      Assessment / plan:    No orders of the defined types were placed in this  encounter.      Meds & Refills for this Visit:  Requested Prescriptions      No prescriptions requested or ordered in this encounter         Encounter Diagnoses   Name Primary?    AK (actinic keratosis) Yes    Seborrheic keratoses     Multiple nevi     Benign neoplasm of skin, unspecified location     Medication monitoring encounter     Seborrheic dermatitis     Rosacea     Dermatitis      .  Hypertrophic AK biopsy 1/24 right forehead    Actinic keratoses.  Precancerous nature discussed.  Treatment options reviewed at length we will proceed with topical therapy with    Efudex wice weekly for   6   week course  of actual medication use and may alternate weeks if necessary.  Increased redness scaling crusting irritation pain tenderness potential discussed.  Anticipate one month for resolution of symptoms.  Plan recheck in one month after completion of treatment course.  Consider alternative treatment biopsy if not resolved.  Patient has not started the fluorouracil from previous recommendation  Forehead area nose improved.    Repeat course on nose small keratotic area remaining treated with cryo, right anterior pinna.  Erythematous scaling keratotic papules noted at sites noted on map  Actinic Keratoses.  Precancerous nature discussed. Sun protection, sunscreen/ blocks encouraged Lesions treated with cryo- .  Biopsy if not resolved.    Right wrist, left dorsal hand  Will use fluorouracil to hands,    Reassurance regarding SKs      Continue triamcinolone may use more frequently and in between if more irritation from the fluorouracil moisturizers  Await pathology    Scaling area at right cheek more consistent with seborrheic dermatitis triamcinolone to this area    consider alternative topical in the fall.  Continue careful sun protection.  Overall is doing pretty well.  No other significant changes in exam  SKs over the anterior scalp    Right ear okay continue monitoring.    Over the anterior scalp and vertex few  scattered lesions.    Continue careful sun protection overall AK's on the arms and hands stable again consider repeat course of Efudex    Subdermal as noted more prominent on face  Improved seborrheic dermatitis scalp temples chest at central chest we will add Nizoral shampoo to this area seborrhea flaring on the beard area will use triamcinolone, ketoconazole cream continue current regimen overall doing well  Pathophysiology discussed with patient.  Therapeutic options reviewed.  See  Medications in grid.  Instructions reviewed at length.  Continue current regimen      Rosacea..Rosacea.  Stable continue topical metronidazole has not been using this as consistently use daily over the central face meds in grid.  Skin care instructions reviewed.  Pathophysiology reviewed.  Chronic recurrent nature discussed.  Patient will let us know how they are doing over the next several weeks.  Await clinical response to above therapy.  Continue metronidazole, Nizoral.  Consider doxy if worsening  Okay presently patient under more stress will monitor patient's wife with recent diagnosis of hydrocephalus problems with her shunt.  Seborrheic dermatitis Nizoral, path ophysiology over-the-counter dandruff shampoos discussed.  Overall doing fairly well  Improved overall more diffuse dermatitis over the arms hands chest back add Zyrtec due to twice daily.  Continue triamcinolone as needed does have this at home await response if not helping he will let us know.  We will plan routine follow-up in 6 months    Waxy tan plaques at temples, parietal scalp.  Reassurance given regarding benign keratoses.      Eczema, notalgia paresthetica upper back stable.    Hand dermatitis triamcinolone as needed.  Continue regular follow-ups we will plan recheck in 6 months or as needed    Recheck 4 to 6 months or as needed  Please refer to map for specific lesions.  See additional diagnoses.  Pros cons of various therapies, risks benefits  discussed.Pathophysiology discussed with patient.  Therapeutic options reviewed.  See  Medications in grid.  Instructions reviewed at length.    Benign nevi, seborrheic  keratoses, cherry angiomas:  Reassurance regarding other benign skin lesions.Signs and symptoms of skin cancer, ABCDE's of melanoma discussed with patient. Sunscreen use, sun protection, self exams reviewed.  Followup as noted RTC ---routine checkup    6 mos -one year or p.r.n.    Encounter Times  Including precharting, reviewing chart, prior notes obtaining history: 5 minutes, medical exam :10 minutes, notes on body map, plan, counseling 10minutes My total time spent caring for the patient on the day of the encounter: 25 minutes     The patient indicates understanding of these issues and agrees to the plan.  The patient is asked to return as noted in follow-up/ above.    This note was generated using Dragon voice recognition software.  Please contact me regarding any confusion resulting from errors in recognition.

## 2024-09-23 ENCOUNTER — OFFICE VISIT (OUTPATIENT)
Dept: DERMATOLOGY CLINIC | Facility: CLINIC | Age: 74
End: 2024-09-23
Payer: MEDICARE

## 2024-09-23 DIAGNOSIS — L30.9 DERMATITIS: ICD-10-CM

## 2024-09-23 DIAGNOSIS — L57.0 AK (ACTINIC KERATOSIS): Primary | ICD-10-CM

## 2024-09-23 DIAGNOSIS — Z51.81 MEDICATION MONITORING ENCOUNTER: ICD-10-CM

## 2024-09-23 DIAGNOSIS — L82.1 SEBORRHEIC KERATOSES: ICD-10-CM

## 2024-09-23 DIAGNOSIS — L71.9 ROSACEA: ICD-10-CM

## 2024-09-23 DIAGNOSIS — D22.9 MULTIPLE NEVI: ICD-10-CM

## 2024-09-23 DIAGNOSIS — L21.9 SEBORRHEIC DERMATITIS: ICD-10-CM

## 2024-09-23 DIAGNOSIS — D23.9 BENIGN NEOPLASM OF SKIN, UNSPECIFIED LOCATION: ICD-10-CM

## 2024-09-23 PROCEDURE — 17000 DESTRUCT PREMALG LESION: CPT | Performed by: DERMATOLOGY

## 2024-09-23 PROCEDURE — 17003 DESTRUCT PREMALG LES 2-14: CPT | Performed by: DERMATOLOGY

## 2024-09-23 PROCEDURE — 99213 OFFICE O/P EST LOW 20 MIN: CPT | Performed by: DERMATOLOGY

## 2024-09-30 NOTE — PROGRESS NOTES
Marlon Clark is a 74 year old male.  HPI:     CC:    Chief Complaint   Patient presents with    Actinic Keratosis     LOV . Hx of Aks. Pt denies any area of concern. Denies any skin CA hx.          Allergies:  Patient has no known allergies.    HISTORY:    Past Medical History:    Actinic keratosis    right inferior forehead    Allergic rhinitis    CAD (coronary artery disease)    Ptca/vinayak 04     Eczema    H/O cardiovascular stress test    EKG stress test    Headache    High blood pressure    High cholesterol    HTN (hypertension)    Obesity    Osteoarthritis    Other and unspecified hyperlipidemia    Psoriasis    S/P coronary artery stent placement    Unspecified essential hypertension    V tach (HCC)      Past Surgical History:   Procedure Laterality Date    Angiogram      per [facility]:  right foot surger     Angioplasty (coronary)      Colonoscopy      Colonoscopy N/A 10/10/2023    Procedure: COLONOSCOPY;  Surgeon: Keith Power MD;  Location: [facility] ENDOSCOPY    Foot/toes surgery proc unlisted      Leg/ankle surgery proc unlisted      fixed arch    Vasectomy        Family History   Problem Relation Age of Onset    Kidney Disease Father         renal disease    Heart Disease Mother         CAD    Heart Disorder Mother         Heart valve/aortic Aneurizum      Social History     Socioeconomic History    Marital status:    Tobacco Use    Smoking status: Former     Current packs/day: 0.00     Types: Cigarettes     Quit date: 12/10/2004     Years since quittin.8    Smokeless tobacco: Never   Vaping Use    Vaping status: Never Used   Substance and Sexual Activity    Alcohol use: Not Currently     Alcohol/week: 2.0 standard drinks of alcohol     Comment: very rarely    Drug use: No    Sexual activity: Yes   Other Topics Concern    Grew up on a farm No    History of tanning No    Outdoor occupation Yes     Comment: , Excavating    Pt has a pacemaker No    Pt has a  defibrillator No    Reaction to local anesthetic No    Caffeine Concern Yes     Comment: soda; 36 oz.     Social Determinants of Health     Financial Resource Strain: Low Risk  (5/13/2024)    Received from [facility]    Overall Financial Resource Strain (CARDIA)     Difficulty of Paying Living Expenses: Not hard at all   Food Insecurity: No Food Insecurity (5/13/2024)    Received from [facility]    Hunger Vital Sign     Worried About Running Out of Food in the Last Year: Never true     Ran Out of Food in the Last Year: Never true   Transportation Needs: No Transportation Needs (5/13/2024)    Received from [facility]    PRAPARE - Transportation     Lack of Transportation (Medical): No     Lack of Transportation (Non-Medical): No   Housing Stability: Unknown (5/13/2024)    Received from [facility]    Housing Stability Vital Sign     Unable to Pay for Housing in the Last Year: No     Unstable Housing in the Last Year: No        Current Outpatient Medications   Medication Sig Dispense Refill    cholecalciferol 1.25 MG (73952 UT) Oral Cap Take 2 tablets by mouth daily.      triamcinolone 0.1 % External Cream Apply 1 Application topically 2 (two) times daily. 454 g 0    metFORMIN  MG Oral Tablet 24 Hr Take 1 tablet (500 mg total) by mouth daily with breakfast.      Vitamin C 500 MG Oral Tab Take 1 tablet (500 mg total) by mouth daily.      cholecalciferol 50 MCG (2000 UT) Oral Cap Take 2 capsules (4,000 Units total) by mouth daily.      Na Sulfate-K Sulfate-Mg Sulf (SUPREP BOWEL PREP KIT) 17.5-3.13-1.6 GM/177ML Oral Solution Take as directed 1 each 0    aspirin (ASPIRIN 81) 81 MG Oral Chew Tab Chew 1 tablet (81 mg total) by mouth daily.      topiramate 25 MG Oral Tab Take 1 tablet (25 mg total) by mouth 2 (two) times daily.      KETOCONAZOLE 2 % External Shampoo APPLY TO SCALP TWO TIMES A WEEK 120 mL 0    SIMVASTATIN 80 MG Oral  Tab Take 1 tablet (80 mg total) by mouth nightly  90 tablet 3    METOPROLOL SUCCINATE 50 MG Oral Tablet 24 Hr TAKE ONE TABLET BY MOUTH ONE TIME DAILY  90 tablet 3    FLUOROURACIL 5 % External Cream Use twice weekly at night as directed x 6 weeks 40 g 1    metRONIDAZOLE 0.75 % External Cream Use bid to face 60 g 6    aspirin 81 MG Oral Tab Take 1 tablet (81 mg total) by mouth daily.      erythromycin 5 MG/GM Ophthalmic Ointment Apply 1/4 in ribbon to left lower conjunctival sac and to outer corner of eye three times daily for 7 days (Patient not taking: Reported on 2023) 1 g 0     Allergies:   No Known Allergies    Past Medical History:    Actinic keratosis    right inferior forehead    Allergic rhinitis    CAD (coronary artery disease)    Ptca/vinayak '04     Eczema    H/O cardiovascular stress test    EKG stress test    Headache    High blood pressure    High cholesterol    HTN (hypertension)    Obesity    Osteoarthritis    Other and unspecified hyperlipidemia    Psoriasis    S/P coronary artery stent placement    Unspecified essential hypertension    V tach (HCC)     Past Surgical History:   Procedure Laterality Date    Angiogram      per [facility]:  right foot surger     Angioplasty (coronary)      Colonoscopy      Colonoscopy N/A 10/10/2023    Procedure: COLONOSCOPY;  Surgeon: Keith Power MD;  Location: [facility] ENDOSCOPY    Foot/toes surgery proc unlisted      Leg/ankle surgery proc unlisted      fixed arch    Vasectomy       Social History     Socioeconomic History    Marital status:      Spouse name: Not on file    Number of children: Not on file    Years of education: Not on file    Highest education level: Not on file   Occupational History    Not on file   Tobacco Use    Smoking status: Former     Current packs/day: 0.00     Types: Cigarettes     Quit date: 12/10/2004     Years since quittin.8    Smokeless tobacco: Never   Vaping Use    Vaping status: Never Used   Substance and  Sexual Activity    Alcohol use: Not Currently     Alcohol/week: 2.0 standard drinks of alcohol     Comment: very rarely    Drug use: No    Sexual activity: Yes   Other Topics Concern    Grew up on a farm No    History of tanning No    Outdoor occupation Yes     Comment: , Excavating    Pt has a pacemaker No    Pt has a defibrillator No    Reaction to local anesthetic No     Service Not Asked    Blood Transfusions Not Asked    Caffeine Concern Yes     Comment: soda; 36 oz.    Occupational Exposure Not Asked    Hobby Hazards Not Asked    Sleep Concern Not Asked    Stress Concern Not Asked    Weight Concern Not Asked    Special Diet Not Asked    Back Care Not Asked    Exercise Not Asked    Bike Helmet Not Asked    Seat Belt Not Asked    Self-Exams Not Asked   Social History Narrative    Not on file     Social Determinants of Health     Financial Resource Strain: Low Risk  (5/13/2024)    Received from [facility]    Overall Financial Resource Strain (CARDIA)     Difficulty of Paying Living Expenses: Not hard at all   Food Insecurity: No Food Insecurity (5/13/2024)    Received from [facility]    Hunger Vital Sign     Worried About Running Out of Food in the Last Year: Never true     Ran Out of Food in the Last Year: Never true   Transportation Needs: No Transportation Needs (5/13/2024)    Received from [facility]    PRAPARE - Transportation     Lack of Transportation (Medical): No     Lack of Transportation (Non-Medical): No   Physical Activity: Not on file   Stress: Not on file   Social Connections: Not on file   Housing Stability: Unknown (5/13/2024)    Received from [facility]    Housing Stability Vital Sign     Unable to Pay for Housing in the Last Year: No     Number of Places Lived in the Last Year: Not on file     Unstable Housing in the Last Year: No     Family History   Problem Relation Age of Onset     Kidney Disease Father         renal disease    Heart Disease Mother         CAD    Heart Disorder Mother         Heart valve/aortic Aneurizum       There were no vitals filed for this visit.    HPI:  Chief Complaint   Patient presents with    Actinic Keratosis     LOV 04/24. Hx of Aks. Pt denies any area of concern. Denies any skin CA hx.      Past notes/ records and appropriate/relevant lab results including pathology and past body maps reviewed. Updated and new information noted in current visit.     Has not yet started fluorouracil concerns over areas healing post cryo did take a while for this to resolve  Triamcinolone helpful for dryness  Follow-up actinic keratoses, dermatitis.  continues to use fluorouracil intermittently previously side effect of headaches and side effects over bigger areas.  As tolerated to spots concerned with several areas around the hands.    Still does have triamcinolone at home    Generally has been pretty careful sun protection.  Has been some scaling on the chest  Patient presents with concerns above.  Rosacea flaring intermittently and more papules redness.  Using metronidazole.  Patient has been in their usual state of health.  History, medications, allergies reviewed as noted.      ROS:  Denies any other systemic complaints.  No new or changeing lesions other than noted above. No fevers, chills, night sweats, unusual sun sensitivity.  No other skin complaints.        History, medications, allergies reviewed as noted.       Physical Examination:     Well-developed well-nourished patient alert oriented in no acute distress.  Exam performed, including scalp, head, neck, face,nails, hair, external eyes, including conjunctival mucosa, eyelids, lips external ears , arms, digits,palms.     Multiple light to medium brown, well marginated, uniformly pigmented, macules and papules 6 mm and less scattered on exam. pigmented lesions examined with dermoscopy benign-appearing patterns.     Waxy  tannish keratotic papules scattered, cherry-red vascular papules scattered.    See map today's date for lesions noted .  See assessment and plan below for specific lesions.  Background erythema papules pustules    Complaining of itchy area at back eczematous changes erythema noted no suspicious lesions  Otherwise remarkable for lesions as noted on map.      Assessment / plan:    No orders of the defined types were placed in this encounter.      Meds & Refills for this Visit:  Requested Prescriptions      No prescriptions requested or ordered in this encounter         Encounter Diagnoses   Name Primary?    AK (actinic keratosis) Yes    Seborrheic keratoses     Multiple nevi     Benign neoplasm of skin, unspecified location     Medication monitoring encounter     Seborrheic dermatitis     Rosacea     Dermatitis      .  Hypertrophic AK biopsy 1/24 right forehead    AK, post fluorouracil face tolerated well had fine bumps over both hands possible photodermatitis from this, Benadryl, Benadryl cream had helped.  Overall doing well    Repeat course on nose small keratotic area remaining treated with cryo, right anterior pinna.  Erythematous scaling keratotic papules noted at sites noted on map  Actinic Keratoses.  Precancerous nature discussed. Sun protection, sunscreen/ blocks encouraged Lesions treated with cryo- .  Biopsy if not resolved.    Bilateral handsx2  Overall improved      Overall stable doing well AK significantly improved.  Will continue careful monitoring follow-up possible repeat course of fluorouracil.  Has completed course.  Itchy bumps have resolved over the hands monitor carefully he will let us know if this flares again    Reassurance regarding SKs    Exam otherwise unchanged  Continue triamcinolone may use more frequently and in between if more irritation from the fluorouracil moisturizers  Await pathology    Scaling area at right cheek more consistent with seborrheic dermatitis triamcinolone to this  area    consider alternative topical in the fall.  Continue careful sun protection.  Overall is doing pretty well.  No other significant changes in exam  SKs over the anterior scalp    Right ear okay continue monitoring.    Over the anterior scalp and vertex few scattered lesions.    Continue careful sun protection overall AK's on the arms and hands stable again consider repeat course of Efudex    Subdermal as noted more prominent on face  Improved seborrheic dermatitis scalp temples chest at central chest we will add Nizoral shampoo to this area seborrhea flaring on the beard area will use triamcinolone, ketoconazole cream continue current regimen overall doing well  Pathophysiology discussed with patient.  Therapeutic options reviewed.  See  Medications in grid.  Instructions reviewed at length.  Continue current regimen      Rosacea..Rosacea.  Stable continue topical metronidazole has not been using this as consistently use daily over the central face meds in grid.  Skin care instructions reviewed.  Pathophysiology reviewed.  Chronic recurrent nature discussed.  Patient will let us know how they are doing over the next several weeks.  Await clinical response to above therapy.  Continue metronidazole, Nizoral.  Consider doxy if worsening  Okay presently patient under more stress will monitor patient's wife with recent diagnosis of hydrocephalus problems with her shunt.  Seborrheic dermatitis Nizoral, path ophysiology over-the-counter dandruff shampoos discussed.  Overall doing fairly well  Improved overall more diffuse dermatitis over the arms hands chest back add Zyrtec due to twice daily.  Continue triamcinolone as needed does have this at home await response if not helping he will let us know.  We will plan routine follow-up in 6 months    Waxy tan plaques at temples, parietal scalp.  Reassurance given regarding benign keratoses.      Eczema, notalgia paresthetica upper back stable.    Hand dermatitis  triamcinolone as needed.  Continue regular follow-ups we will plan recheck in 6 months or as needed    Recheck 4 to 6 months or as needed  Please refer to map for specific lesions.  See additional diagnoses.  Pros cons of various therapies, risks benefits discussed.Pathophysiology discussed with patient.  Therapeutic options reviewed.  See  Medications in grid.  Instructions reviewed at length.    Benign nevi, seborrheic  keratoses, cherry angiomas:  Reassurance regarding other benign skin lesions.Signs and symptoms of skin cancer, ABCDE's of melanoma discussed with patient. Sunscreen use, sun protection, self exams reviewed.  Followup as noted RTC ---routine checkup    6 mos -one year or p.r.n.    Encounter Times  Including precharting, reviewing chart, prior notes obtaining history: 5 minutes, medical exam :10 minutes, notes on body map, plan, counseling 10minutes My total time spent caring for the patient on the day of the encounter: 25 minutes     The patient indicates understanding of these issues and agrees to the plan.  The patient is asked to return as noted in follow-up/ above.    This note was generated using Dragon voice recognition software.  Please contact me regarding any confusion resulting from errors in recognition.

## 2025-01-22 ENCOUNTER — OFFICE VISIT (OUTPATIENT)
Dept: DERMATOLOGY CLINIC | Facility: CLINIC | Age: 75
End: 2025-01-22
Payer: MEDICARE

## 2025-01-22 DIAGNOSIS — L21.9 SEBORRHEIC DERMATITIS: ICD-10-CM

## 2025-01-22 DIAGNOSIS — Z51.81 MEDICATION MONITORING ENCOUNTER: ICD-10-CM

## 2025-01-22 DIAGNOSIS — L71.9 ROSACEA: ICD-10-CM

## 2025-01-22 DIAGNOSIS — L57.0 AK (ACTINIC KERATOSIS): Primary | ICD-10-CM

## 2025-01-22 DIAGNOSIS — D23.9 BENIGN NEOPLASM OF SKIN, UNSPECIFIED LOCATION: ICD-10-CM

## 2025-01-22 DIAGNOSIS — D22.9 MULTIPLE NEVI: ICD-10-CM

## 2025-01-22 DIAGNOSIS — L30.9 DERMATITIS: ICD-10-CM

## 2025-01-22 DIAGNOSIS — L82.1 SEBORRHEIC KERATOSES: ICD-10-CM

## 2025-01-22 PROCEDURE — 99213 OFFICE O/P EST LOW 20 MIN: CPT | Performed by: DERMATOLOGY

## 2025-01-22 PROCEDURE — 17004 DESTROY PREMAL LESIONS 15/>: CPT | Performed by: DERMATOLOGY

## 2025-01-22 RX ORDER — FLUTICASONE PROPIONATE 50 MCG
1 SPRAY, SUSPENSION (ML) NASAL DAILY
COMMUNITY
Start: 2024-11-21

## 2025-01-22 NOTE — PROGRESS NOTES
The following individual(s) verbally consented to be recorded using ambient AI listening technology and understand that they can each withdraw their consent to this listening technology at any point by asking the clinician to turn off or pause the recording: Marlon Clark

## 2025-01-27 NOTE — PROGRESS NOTES
Marlon Clark is a 74 year old male.  HPI:     CC:    Chief Complaint   Patient presents with    Upper Body Exam     Hx of AKS.  LOV 2024.  Pt preseents for UBSE.   Uses FLUOROURACIL PRN to the forehead/frontal scalp.           Allergies:  Patient has no known allergies.    HISTORY:    Past Medical History:    Actinic keratosis    right inferior forehead    Allergic rhinitis    CAD (coronary artery disease)    Ptca/vinayak '04     Eczema    H/O cardiovascular stress test    EKG stress test    Headache    High blood pressure    High cholesterol    HTN (hypertension)    Obesity    Osteoarthritis    Other and unspecified hyperlipidemia    Psoriasis    S/P coronary artery stent placement    Unspecified essential hypertension    V tach (HCC)      Past Surgical History:   Procedure Laterality Date    Angiogram      per [facility]:  right foot surger     Angioplasty (coronary)      Colonoscopy      Colonoscopy N/A 10/10/2023    Procedure: COLONOSCOPY;  Surgeon: Keith Power MD;  Location: [facility] ENDOSCOPY    Foot/toes surgery proc unlisted      Leg/ankle surgery proc unlisted      fixed arch    Vasectomy        Family History   Problem Relation Age of Onset    Kidney Disease Father         renal disease    Heart Disease Mother         CAD    Heart Disorder Mother         Heart valve/aortic Aneurizum      Social History     Socioeconomic History    Marital status:    Tobacco Use    Smoking status: Former     Current packs/day: 0.00     Types: Cigarettes     Quit date: 12/10/2004     Years since quittin.1    Smokeless tobacco: Never   Vaping Use    Vaping status: Never Used   Substance and Sexual Activity    Alcohol use: Not Currently     Alcohol/week: 2.0 standard drinks of alcohol     Comment: very rarely    Drug use: No    Sexual activity: Yes   Other Topics Concern    Grew up on a farm No    History of tanning Yes    Outdoor occupation Yes     Comment: , Excavating    Pt has a  pacemaker No    Pt has a defibrillator No    Reaction to local anesthetic No    Caffeine Concern Yes     Comment: soda; 36 oz.     Social Drivers of Health     Financial Resource Strain: Low Risk  (11/14/2024)    Received from [facility]    Overall Financial Resource Strain (CARDIA)     Difficulty of Paying Living Expenses: Not hard at all   Food Insecurity: No Food Insecurity (11/14/2024)    Received from [facility]    Hunger Vital Sign     Worried About Running Out of Food in the Last Year: Never true     Ran Out of Food in the Last Year: Never true   Transportation Needs: No Transportation Needs (11/14/2024)    Received from [facility]    PRAPARE - Transportation     Lack of Transportation (Medical): No     Lack of Transportation (Non-Medical): No   Housing Stability: Unknown (5/13/2024)    Received from [facility]    Housing Stability Vital Sign     Unable to Pay for Housing in the Last Year: No     Unstable Housing in the Last Year: No        Current Outpatient Medications   Medication Sig Dispense Refill    fluticasone propionate 50 MCG/ACT Nasal Suspension 1 spray by Each Nare route daily.      triamcinolone 0.1 % External Cream Apply 1 Application topically 2 (two) times daily. 454 g 0    metFORMIN  MG Oral Tablet 24 Hr Take 1 tablet (500 mg total) by mouth daily with breakfast.      Vitamin C 500 MG Oral Tab Take 1 tablet (500 mg total) by mouth daily.      cholecalciferol 50 MCG (2000 UT) Oral Cap Take 2 capsules (4,000 Units total) by mouth daily.      aspirin (ASPIRIN 81) 81 MG Oral Chew Tab Chew 1 tablet (81 mg total) by mouth daily.      topiramate 25 MG Oral Tab Take 1 tablet (25 mg total) by mouth 2 (two) times daily.      KETOCONAZOLE 2 % External Shampoo APPLY TO SCALP TWO TIMES A WEEK 120 mL 0    SIMVASTATIN 80 MG Oral Tab Take 1 tablet (80 mg total) by mouth nightly  90 tablet 3    METOPROLOL SUCCINATE  50 MG Oral Tablet 24 Hr TAKE ONE TABLET BY MOUTH ONE TIME DAILY  90 tablet 3    metRONIDAZOLE 0.75 % External Cream Use bid to face 60 g 6    aspirin 81 MG Oral Tab Take 1 tablet (81 mg total) by mouth daily.      cholecalciferol 1.25 MG (42316 UT) Oral Cap Take 2 tablets by mouth daily.      Na Sulfate-K Sulfate-Mg Sulf (SUPREP BOWEL PREP KIT) 17.5-3.13-1.6 GM/177ML Oral Solution Take as directed (Patient not taking: Reported on 1/22/2025) 1 each 0    erythromycin 5 MG/GM Ophthalmic Ointment Apply 1/4 in ribbon to left lower conjunctival sac and to outer corner of eye three times daily for 7 days (Patient not taking: Reported on 1/22/2025) 1 g 0    FLUOROURACIL 5 % External Cream Use twice weekly at night as directed x 6 weeks (Patient not taking: Reported on 1/22/2025) 40 g 1     Allergies:   No Known Allergies    Past Medical History:    Actinic keratosis    right inferior forehead    Allergic rhinitis    CAD (coronary artery disease)    Ptca/vinayak '04     Eczema    H/O cardiovascular stress test    EKG stress test    Headache    High blood pressure    High cholesterol    HTN (hypertension)    Obesity    Osteoarthritis    Other and unspecified hyperlipidemia    Psoriasis    S/P coronary artery stent placement    Unspecified essential hypertension    V tach (HCC)     Past Surgical History:   Procedure Laterality Date    Angiogram  1997    per [facility]:  right foot surger 2008    Angioplasty (coronary)      Colonoscopy  2010    Colonoscopy N/A 10/10/2023    Procedure: COLONOSCOPY;  Surgeon: Keith Power MD;  Location: [facility] ENDOSCOPY    Foot/toes surgery proc unlisted      Leg/ankle surgery proc unlisted      fixed arch    Vasectomy       Social History     Socioeconomic History    Marital status:      Spouse name: Not on file    Number of children: Not on file    Years of education: Not on file    Highest education level: Not on file   Occupational History    Not on file   Tobacco Use    Smoking status:  Former     Current packs/day: 0.00     Types: Cigarettes     Quit date: 12/10/2004     Years since quittin.1    Smokeless tobacco: Never   Vaping Use    Vaping status: Never Used   Substance and Sexual Activity    Alcohol use: Not Currently     Alcohol/week: 2.0 standard drinks of alcohol     Comment: very rarely    Drug use: No    Sexual activity: Yes   Other Topics Concern    Grew up on a farm No    History of tanning Yes    Outdoor occupation Yes     Comment: , Excavating    Pt has a pacemaker No    Pt has a defibrillator No    Reaction to local anesthetic No     Service Not Asked    Blood Transfusions Not Asked    Caffeine Concern Yes     Comment: soda; 36 oz.    Occupational Exposure Not Asked    Hobby Hazards Not Asked    Sleep Concern Not Asked    Stress Concern Not Asked    Weight Concern Not Asked    Special Diet Not Asked    Back Care Not Asked    Exercise Not Asked    Bike Helmet Not Asked    Seat Belt Not Asked    Self-Exams Not Asked   Social History Narrative    Not on file     Social Drivers of Health     Financial Resource Strain: Low Risk  (2024)    Received from [facility]    Overall Financial Resource Strain (CARDIA)     Difficulty of Paying Living Expenses: Not hard at all   Food Insecurity: No Food Insecurity (2024)    Received from [facility]    Hunger Vital Sign     Worried About Running Out of Food in the Last Year: Never true     Ran Out of Food in the Last Year: Never true   Transportation Needs: No Transportation Needs (2024)    Received from [facility]    PRAPARE - Transportation     Lack of Transportation (Medical): No     Lack of Transportation (Non-Medical): No   Physical Activity: Not on file   Stress: Not on file   Social Connections: Not on file   Housing Stability: Unknown (2024)    Received from [facility]    Housing Stability Vital Sign     Unable  to Pay for Housing in the Last Year: No     Number of Places Lived in the Last Year: Not on file     Unstable Housing in the Last Year: No     Family History   Problem Relation Age of Onset    Kidney Disease Father         renal disease    Heart Disease Mother         CAD    Heart Disorder Mother         Heart valve/aortic Aneurizum       There were no vitals filed for this visit.    HPI:  Chief Complaint   Patient presents with    Upper Body Exam     Hx of AKS.  LOV 9/2024.  Pt preseents for UBSE.   Uses FLUOROURACIL PRN to the forehead/frontal scalp.       Past notes/ records and appropriate/relevant lab results including pathology and past body maps reviewed. Updated and new information noted in current visit.     Has not yet started fluorouracil concerns over areas healing post cryo did take a while for this to resolve  Triamcinolone helpful for dryness  Follow-up actinic keratoses, dermatitis.  continues to use fluorouracil intermittently previously side effect of headaches and side effects over bigger areas.  As tolerated to spots concerned with several areas around the hands.    Still does have triamcinolone at home    Generally has been pretty careful sun protection.  Has been some scaling on the chest  Patient presents with concerns above.  Rosacea flaring intermittently and more papules redness.  Using metronidazole.  Patient has been in their usual state of health.  History, medications, allergies reviewed as noted.      ROS:  Denies any other systemic complaints.  No new or changeing lesions other than noted above. No fevers, chills, night sweats, unusual sun sensitivity.  No other skin complaints.        History, medications, allergies reviewed as noted.       Physical Examination:     Well-developed well-nourished patient alert oriented in no acute distress.  Exam performed, including scalp, head, neck, face,nails, hair, external eyes, including conjunctival mucosa, eyelids, lips external ears , arms,  digits,palms.     Multiple light to medium brown, well marginated, uniformly pigmented, macules and papules 6 mm and less scattered on exam. pigmented lesions examined with dermoscopy benign-appearing patterns.     Waxy tannish keratotic papules scattered, cherry-red vascular papules scattered.    See map today's date for lesions noted .  See assessment and plan below for specific lesions.  Background erythema papules pustules    Complaining of itchy area at back eczematous changes erythema noted no suspicious lesions  Otherwise remarkable for lesions as noted on map.      Assessment / plan:    No orders of the defined types were placed in this encounter.      Meds & Refills for this Visit:  Requested Prescriptions      No prescriptions requested or ordered in this encounter         Encounter Diagnoses   Name Primary?    AK (actinic keratosis) Yes    Seborrheic dermatitis     Rosacea     Dermatitis     Medication monitoring encounter     Seborrheic keratoses     Benign neoplasm of skin, unspecified location     Multiple nevi      History of Present Illness  The patient, with a history of skin cancer, presents for a follow-up visit. He has been using fluorouracil on his forehead for a four-week cycle, as previously instructed. The patient reports that the treated area goes through cycles of scabbing and rescaling, and is currently on its fourth cycle. He has not used the medication since the initial four-week period.    The patient also reports a pimple-like lesion on his face and scaling in his ear, which has previously been treated and has gone through two cycles of scabbing and rescaling. He has a history of seborrhea, which he has been managing with a topical treatment. However, he notes that if he does not shave for a day or two, the dry skin returns.    The patient has been using ketoconazole shampoo, but not on his beard area. He also reports a history of actinic keratosis on his neck, which has been  previously treated and is reportedly improving.    Generally more careful with sun protection long sleeves     Physical Exam  SKIN: Scalp shows improvement with signs of seborrheic dermatitis presenting as dandruff. Right ear has actinic keratosis and precancerous lesion, both looking better compared to previous condition. Right forehead exhibits actinic keratosis with four additional actinic keratoses along the right forehead hairline. Beard area and cheeks show signs of seborrheic dermatitis. Back has multiple freckles and age spots, overall good condition. Additional actinic keratosis found on the neck.    Results      Assessment & Plan  Actinic Keratosis  Multiple actinic keratoses on the right ear, right forehead hairline, and neck.    Erythematous scaling keratotic papules noted at sites noted on map  Actinic Keratoses.  Precancerous nature discussed. Sun protection, sunscreen/ blocks encouraged Lesions treated with cryo- .  Biopsy if not resolved.    Right forehead left neck right anterior pinna arms hands X21  Previous reaction to fluorouracil with hands arms had subsided photodermatitis responded to Benadryl topically oral previously   He has been using fluorouracil for four weeks, with cycles of scabbing and healing. Lesions appear improved but still present. Ongoing treatment with weekly fluorouracil is necessary to prevent progression to squamous cell carcinoma. Emphasized sun protection to prevent further damage.  - Continue fluorouracil on the right forehead once a week indefinitely  - Consider fluorouracil for the hands in the future  - Apply sunscreen regularly and wear long sleeves  - Consider UPF 50+ clothing for additional sun protection    Seborrheic Dermatitis  Scaling in the ear and beard area, consistent with seborrheic dermatitis. Symptoms exacerbated by cold weather. He has been using ketoconazole shampoo, but not on the beard area. Discussed benefits of using ketoconazole shampoo on the  beard area.  - Use ketoconazole shampoo on the beard area, around the nose, chin, sideburns, and eyebrows    General Health Maintenance  Discussed the importance of sun protection and the use of sunscreen. He is generally healthy with no recent illnesses. Discussed benefits of UPF 50+ fabrics and sun-protective clothing options, including hats with neck flaps.  - Apply more sunscreen when driving  - Consider UPF 50+ fabrics for sun protection  - Explore sun-protective clothing options, including hats with neck flaps    Follow-up  - Follow up as needed for reassessment of actinic keratosis and seborrheic dermatitis.      .  Hypertrophic AK biopsy 1/24 right forehea      Reassurance regarding SKs    Exam otherwise unchanged  Continue triamcinolone may use more frequently and in between if more irritation from the fluorouracil moisturizers      Scaling area at right cheek more consistent with seborrheic dermatitis triamcinolone to this area    consider alternative topical in the fall.  Continue careful sun protection.  Overall is doing pretty well.  No other significant changes in exam  SKs over the anterior scalp    Right ear okay continue monitoring.    Over the anterior scalp and vertex few scattered lesions.    Continue careful sun protection overall AK's on the arms and hands stable again consider repeat course of Efudex    Seborrheic dermatitis  Improved seborrheic dermatitis scalp temples chest at central chest we will add Nizoral shampoo to this area seborrhea flaring on the beard area will use triamcinolone, ketoconazole cream continue current regimen overall doing well  Pathophysiology discussed with patient.  Therapeutic options reviewed.  See  Medications in grid.  Instructions reviewed at length.  Continue current regimen      Rosacea..Rosacea.  Stable continue topical metronidazole has not been using this as consistently use daily over the central face meds in grid.  Skin care instructions reviewed.   Pathophysiology reviewed.  Chronic recurrent nature discussed.  Patient will let us know how they are doing over the next several weeks.  Await clinical response to above therapy.  Continue metronidazole, Nizoral.  Consider doxy if worsening  Okay presently patient under more stress will monitor patient's wife with recent diagnosis of hydrocephalus problems with her shunt.  Seborrheic dermatitis Nizoral, path ophysiology over-the-counter dandruff shampoos discussed.  Overall doing fairly well  Improved overall more diffuse dermatitis over the arms hands chest back add Zyrtec due to twice daily.  Continue triamcinolone as needed does have this at home await response if not helping he will let us know.  We will plan routine follow-up in 6 months    Waxy tan plaques at temples, parietal scalp.  Reassurance given regarding benign keratoses.      Eczema, notalgia paresthetica upper back stable.    Hand dermatitis triamcinolone as needed.  Continue regular follow-ups we will plan recheck in 6 months or as needed    Recheck 4 to 6 months or as needed  Please refer to map for specific lesions.  See additional diagnoses.  Pros cons of various therapies, risks benefits discussed.Pathophysiology discussed with patient.  Therapeutic options reviewed.  See  Medications in grid.  Instructions reviewed at length.    Benign nevi, seborrheic  keratoses, cherry angiomas:  Reassurance regarding other benign skin lesions.    Monitor for new or changing lesions. Signs and symptoms of skin cancer, ABCDE's of melanoma ( additional information available at AAD.org, skincancer.org) Encourage Sunscreen (broad-spectrum, ideally mineral-based-UVA/UVB -SPF 30 or higher) use encouraged, sun protection/sun protective clothing, self exams reviewed Followup as noted RTC ---routine checkup 6 mos -one year or p.r.n.    Encounter Times   Including precharting, reviewing chart, prior notes obtaining history: 10 minutes, medical exam :10 minutes, notes  on body map, plan, counseling 10minutes My total time spent caring for the patient on the day of the encounter: 30 minutes     The patient indicates understanding of these issues and agrees to the plan.  The patient is asked to return as noted in follow-up/ above.    This note was generated using Dragon voice recognition software.  Please contact me regarding any confusion resulting from errors in recognition..  Note to patient and family: The 21st Century Cures Act makes medical notes like these available to patients. However, be advised this is a medical document. It is intended as [id number] communication and monitoring of a patient's care needs. It is written in medical language and may contain abbreviations or verbiage that are unfamiliar. It may appear blunt or direct. Medical documents are intended to carry relevant information, facts as evident and the clinical opinion of the practitioner.

## 2025-05-23 ENCOUNTER — OFFICE VISIT (OUTPATIENT)
Dept: DERMATOLOGY CLINIC | Facility: CLINIC | Age: 75
End: 2025-05-23
Payer: MEDICARE

## 2025-05-23 DIAGNOSIS — L57.0 AK (ACTINIC KERATOSIS): Primary | ICD-10-CM

## 2025-05-23 DIAGNOSIS — L71.9 ROSACEA: ICD-10-CM

## 2025-05-23 DIAGNOSIS — D23.9 BENIGN NEOPLASM OF SKIN, UNSPECIFIED LOCATION: ICD-10-CM

## 2025-05-23 DIAGNOSIS — L21.9 SEBORRHEIC DERMATITIS: ICD-10-CM

## 2025-05-23 DIAGNOSIS — Z51.81 MEDICATION MONITORING ENCOUNTER: ICD-10-CM

## 2025-05-23 PROCEDURE — G2211 COMPLEX E/M VISIT ADD ON: HCPCS | Performed by: DERMATOLOGY

## 2025-05-23 PROCEDURE — 99213 OFFICE O/P EST LOW 20 MIN: CPT | Performed by: DERMATOLOGY

## 2025-05-23 NOTE — PROGRESS NOTES
The following individual(s) verbally consented to be recorded using ambient AI listening technology and understand that they can each withdraw their consent to this listening technology at any point by asking the clinician to turn off or pause the recording:    Patient name: Marlon Clark

## 2025-05-26 NOTE — PROGRESS NOTES
Marlon Clark is a 74 year old male.  HPI:     CC:    Chief Complaint   Patient presents with    Upper Body Exam     LOV 2025. Pt present for upper body skin exam. Would like assessment of scalp. Notes some new scaly lesions. Hx of AK's.          Allergies:  Patient has no known allergies.    HISTORY:    Past Medical History:    Actinic keratosis    right inferior forehead    Allergic rhinitis    CAD (coronary artery disease)    Ptca/vinayak '04     Eczema    H/O cardiovascular stress test    EKG stress test    Headache    High blood pressure    High cholesterol    HTN (hypertension)    Obesity    Osteoarthritis    Other and unspecified hyperlipidemia    Psoriasis    S/P coronary artery stent placement    Unspecified essential hypertension    V tach (HCC)      Past Surgical History:   Procedure Laterality Date    Angiogram      per [facility]:  right foot surger     Angioplasty (coronary)      Colonoscopy      Colonoscopy N/A 10/10/2023    Procedure: COLONOSCOPY;  Surgeon: Keith Power MD;  Location: [facility] [place] ENDOSCOPY    Foot/toes surgery proc unlisted      Leg/ankle surgery proc unlisted      fixed arch    Vasectomy        Family History   Problem Relation Age of Onset    Kidney Disease Father         renal disease    Heart Disease Mother         CAD    Heart Disorder Mother         Heart valve/aortic Aneurizum      Social History     Socioeconomic History    Marital status:    Tobacco Use    Smoking status: Former     Current packs/day: 0.00     Types: Cigarettes     Quit date: 12/10/2004     Years since quittin.4    Smokeless tobacco: Never   Vaping Use    Vaping status: Never Used   Substance and Sexual Activity    Alcohol use: Not Currently     Alcohol/week: 2.0 standard drinks of alcohol     Comment: very rarely    Drug use: No    Sexual activity: Yes   Other Topics Concern    Grew up on a farm No    History of tanning Yes    Outdoor occupation Yes     Comment: ,  Excavating    Pt has a pacemaker No    Pt has a defibrillator No    Reaction to local anesthetic No    Caffeine Concern Yes     Comment: soda; 36 oz.     Social Drivers of Health     Food Insecurity: No Food Insecurity (5/16/2025)    Received from [facility]    Hunger Vital Sign     Worried About Running Out of Food in the Last Year: Never true     Ran Out of Food in the Last Year: Never true   Transportation Needs: No Transportation Needs (5/16/2025)    Received from [facility]    PRAPARE - Transportation     Lack of Transportation (Medical): No     Lack of Transportation (Non-Medical): No   Housing Stability: Unknown (5/13/2024)    Received from [facility]    Housing Stability Vital Sign     Unable to Pay for Housing in the Last Year: No     Unstable Housing in the Last Year: No        Current Outpatient Medications   Medication Sig Dispense Refill    fluticasone propionate 50 MCG/ACT Nasal Suspension 1 spray by Each Nare route daily.      triamcinolone 0.1 % External Cream Apply 1 Application topically 2 (two) times daily. 454 g 0    metFORMIN  MG Oral Tablet 24 Hr Take 1 tablet (500 mg total) by mouth daily with breakfast.      Vitamin C 500 MG Oral Tab Take 1 tablet (500 mg total) by mouth daily.      cholecalciferol 50 MCG (2000 UT) Oral Cap Take 2 capsules (4,000 Units total) by mouth daily.      aspirin (ASPIRIN 81) 81 MG Oral Chew Tab Chew 1 tablet (81 mg total) by mouth daily.      topiramate 25 MG Oral Tab Take 1 tablet (25 mg total) by mouth 2 (two) times daily.      KETOCONAZOLE 2 % External Shampoo APPLY TO SCALP TWO TIMES A WEEK 120 mL 0    SIMVASTATIN 80 MG Oral Tab Take 1 tablet (80 mg total) by mouth nightly  90 tablet 3    METOPROLOL SUCCINATE 50 MG Oral Tablet 24 Hr TAKE ONE TABLET BY MOUTH ONE TIME DAILY  90 tablet 3    metRONIDAZOLE 0.75 % External Cream Use bid to face 60 g 6    aspirin 81 MG Oral Tab Take 1 tablet (81 mg  total) by mouth daily.      cholecalciferol 1.25 MG (32022 UT) Oral Cap Take 2 tablets by mouth daily.      Na Sulfate-K Sulfate-Mg Sulf (SUPREP BOWEL PREP KIT) 17.5-3.13-1.6 GM/177ML Oral Solution Take as directed (Patient not taking: Reported on 2025) 1 each 0    erythromycin 5 MG/GM Ophthalmic Ointment Apply 1/4 in ribbon to left lower conjunctival sac and to outer corner of eye three times daily for 7 days (Patient not taking: Reported on 2025) 1 g 0    FLUOROURACIL 5 % External Cream Use twice weekly at night as directed x 6 weeks (Patient not taking: Reported on 2025) 40 g 1     Allergies:   No Known Allergies    Past Medical History:    Actinic keratosis    right inferior forehead    Allergic rhinitis    CAD (coronary artery disease)    Ptca/vinayak '04     Eczema    H/O cardiovascular stress test    EKG stress test    Headache    High blood pressure    High cholesterol    HTN (hypertension)    Obesity    Osteoarthritis    Other and unspecified hyperlipidemia    Psoriasis    S/P coronary artery stent placement    Unspecified essential hypertension    V tach (HCC)     Past Surgical History:   Procedure Laterality Date    Angiogram      per [facility]:  right foot surger     Angioplasty (coronary)      Colonoscopy      Colonoscopy N/A 10/10/2023    Procedure: COLONOSCOPY;  Surgeon: Keith Power MD;  Location: [facility] ENDOSCOPY    Foot/toes surgery proc unlisted      Leg/ankle surgery proc unlisted      fixed arch    Vasectomy       Social History     Socioeconomic History    Marital status:      Spouse name: Not on file    Number of children: Not on file    Years of education: Not on file    Highest education level: Not on file   Occupational History    Not on file   Tobacco Use    Smoking status: Former     Current packs/day: 0.00     Types: Cigarettes     Quit date: 12/10/2004     Years since quittin.4    Smokeless tobacco: Never   Vaping Use    Vaping status: Never Used    Substance and Sexual Activity    Alcohol use: Not Currently     Alcohol/week: 2.0 standard drinks of alcohol     Comment: very rarely    Drug use: No    Sexual activity: Yes   Other Topics Concern    Grew up on a farm No    History of tanning Yes    Outdoor occupation Yes     Comment: , Excavating    Pt has a pacemaker No    Pt has a defibrillator No    Reaction to local anesthetic No     Service Not Asked    Blood Transfusions Not Asked    Caffeine Concern Yes     Comment: soda; 36 oz.    Occupational Exposure Not Asked    Hobby Hazards Not Asked    Sleep Concern Not Asked    Stress Concern Not Asked    Weight Concern Not Asked    Special Diet Not Asked    Back Care Not Asked    Exercise Not Asked    Bike Helmet Not Asked    Seat Belt Not Asked    Self-Exams Not Asked   Social History Narrative    Not on file     Social Drivers of Health     Food Insecurity: No Food Insecurity (5/16/2025)    Received from [facility]    Hunger Vital Sign     Worried About Running Out of Food in the Last Year: Never true     Ran Out of Food in the Last Year: Never true   Transportation Needs: No Transportation Needs (5/16/2025)    Received from [facility]    PRAPARE - Transportation     Lack of Transportation (Medical): No     Lack of Transportation (Non-Medical): No   Stress: Not on file   Housing Stability: Unknown (5/13/2024)    Received from [facility]    Housing Stability Vital Sign     Unable to Pay for Housing in the Last Year: No     Number of Places Lived in the Last Year: Not on file     Unstable Housing in the Last Year: No     Family History   Problem Relation Age of Onset    Kidney Disease Father         renal disease    Heart Disease Mother         CAD    Heart Disorder Mother         Heart valve/aortic Aneurizum       There were no vitals filed for this visit.    HPI:  Chief Complaint   Patient presents with    Upper Body Exam      LOV 1/2025. Pt present for upper body skin exam. Would like assessment of scalp. Notes some new scaly lesions. Hx of AK's.      Past notes/ records and appropriate/relevant lab results including pathology and past body maps reviewed. Updated and new information noted in current visit.     Has not yet started fluorouracil concerns over areas healing post cryo did take a while for this to resolve  Triamcinolone helpful for dryness  Follow-up actinic keratoses, dermatitis.  continues to use fluorouracil intermittently previously side effect of headaches and side effects over bigger areas.  As tolerated to spots concerned with several areas around the hands.    Still does have triamcinolone at home    Generally has been pretty careful sun protection.  Has been some scaling on the chest  Patient presents with concerns above.  Rosacea flaring intermittently and more papules redness.  Using metronidazole.  Patient has been in their usual state of health.  History, medications, allergies reviewed as noted.      ROS:  Denies any other systemic complaints.  No new or changeing lesions other than noted above. No fevers, chills, night sweats, unusual sun sensitivity.  No other skin complaints.        History, medications, allergies reviewed as noted.       Physical Examination:     Well-developed well-nourished patient alert oriented in no acute distress.  Exam performed, including scalp, head, neck, face,nails, hair, external eyes, including conjunctival mucosa, eyelids, lips external ears , arms, digits,palms.     Multiple light to medium brown, well marginated, uniformly pigmented, macules and papules 6 mm and less scattered on exam. pigmented lesions examined with dermoscopy benign-appearing patterns.     Waxy tannish keratotic papules scattered, cherry-red vascular papules scattered.    See map today's date for lesions noted .  See assessment and plan below for specific lesions.  Background erythema papules  pustules    Complaining of itchy area at back eczematous changes erythema noted no suspicious lesions  Otherwise remarkable for lesions as noted on map.      Assessment / plan:    No orders of the defined types were placed in this encounter.      Meds & Refills for this Visit:  Requested Prescriptions      No prescriptions requested or ordered in this encounter         Encounter Diagnoses   Name Primary?    AK (actinic keratosis) Yes    Medication monitoring encounter     Seborrheic dermatitis     Rosacea     Benign neoplasm of skin, unspecified location      History of Present Illness  The patient, with a history of skin cancer, presents for a follow-up visit. He has been using fluorouracil on his forehead for a four-week cycle, as previously instructed. The patient reports that the treated area goes through cycles of scabbing and rescaling, and is currently on its fourth cycle. He has not used the medication since the initial four-week period.    The patient also reports a pimple-like lesion on his face and scaling in his ear, which has previously been treated and has gone through two cycles of scabbing and rescaling. He has a history of seborrhea, which he has been managing with a topical treatment. However, he notes that if he does not shave for a day or two, the dry skin returns.    The patient has been using ketoconazole shampoo, but not on his beard area. He also reports a history of actinic keratosis on his neck, which has been previously treated and is reportedly improving.    Generally more careful with sun protection long sleeves     5/25  Marlon Clark is a 74 year old male with a history of psoriasis who presents for follow-up of scalp treatment.    He began applying a topical treatment to his scalp approximately two weeks after his last visit. Initially, he applied it every two days for a week, then transitioned to daily application. He completed the treatment last Saturday, having applied it  for a total of eight days.    During the last two days of treatment, he experienced a pounding headache and sinus issues, describing his sinuses as turning on and off. These symptoms prompted him to stop the treatment.    A lot of scabbing on his scalp came off last night, leaving behind dry, scaly skin. The scabs fell off onto his pillow and sleeve.    He recalls getting sick for about a week and a half during the treatment course, which interrupted his application schedule. After recovering, he resumed the treatment every other day, then daily.    He has a large hat for sun protection, which he wears during outdoor activities. He is relieved that he does not need to use the cream over the summer as long as he wears his hat.    He has a history of psoriasis, with previous issues in the ear area, which has been treated with freezing in the past. The area is very dry and scaly.    No issues on his back and his legs have not been exposed to the sun in years. He is a  and works in [employer].    Physical Exam        SKIN: Skin with improvement from previous condition. Scabbing resolved. Some areas are dry and scaly. Forehead and hairline skin appear healthy. Small areas in the ear noted. Arms and back skin appear healthy.    Results      Patient seen for follow-up long-term monitoring, treatment of  Extensive actinic damage, monitoring of topical chemotherapy, occupational exposure to carcinogens as a   Plan of care:  ongoing surveillance, monitoring including regular follow-up due to longer term risk of recurrence, new lesions.  See previous notes.  There is a longitudinal care relationship with me, the care plan reflects the ongoing nature of the continuous relationship of care, and the medical record indicates that there is ongoing treatment of a serious/complex medical condition which I am currently managing.  is Applicable     Assessment & Plan  Actinic Keratosis  Actinic Keratoses.   Lesions appear improved, Ongoing treatment with weekly fluorouracil is necessary to prevent progression to squamous cell carcinoma. Emphasized sun protection to prevent further damage.  - Continue fluorouracil on the right forehead once a week indefinitely  - Consider fluorouracil for the hands in the future  - Apply sunscreen regularly and wear long sleeves  - Consider UPF 50+ clothing for additional sun protection    Seborrheic Dermatitis  Scaling in the ear and beard area, consistent with seborrheic dermatitis. Symptoms exacerbated by cold weather. He has been using ketoconazole shampoo, but not on the beard area. Discussed benefits of using ketoconazole shampoo on the beard area.  - Use ketoconazole shampoo on the beard area, around the nose, chin, sideburns, and eyebrows    General Health Maintenance  Discussed the importance of sun protection and the use of sunscreen. He is generally healthy with no recent illnesses. Discussed benefits of UPF 50+ fabrics and sun-protective clothing options, including hats with neck flaps.  - Apply more sunscreen when driving  - Consider UPF 50+ fabrics for sun protection  - Explore sun-protective clothing options, including hats with neck flaps    Follow-up  - Follow up as needed for reassessment of actinic keratosis and seborrheic dermatitis.    5/25  Actinic Keratosis  Actinic keratosis primarily on the scalp, treated with topical immunotherapy. He experienced headache and sinus issues towards the end of treatment, indicating possible systemic absorption. The scalp shows significant improvement with reduced scabbing and dryness. No new lesions on the back or arms. Continued sun protection is advised to prevent recurrence. Topical immunotherapy decreases abnormal cells, reducing inflammation upon retreatment.  - Discontinue topical immunotherapy for the summer.  - Advise wearing a wide-brimmed hat for sun protection.  - Monitor for any new lesions, especially on the arms and  back of the hands.  - Reassess in the fall for potential retreatment.          .  Hypertrophic AK biopsy 1/24 right forehea      Reassurance regarding SKs    Exam otherwise unchanged  Continue triamcinolone may use more frequently and in between if more irritation from the fluorouracil moisturizers      Scaling area at right cheek more consistent with seborrheic dermatitis triamcinolone to this area    consider alternative topical in the fall.  Continue careful sun protection.  Overall is doing pretty well.  No other significant changes in exam  SKs over the anterior scalp    Right ear okay continue monitoring.    Over the anterior scalp and vertex few scattered lesions.    Continue careful sun protection overall AK's on the arms and hands stable again consider repeat course of Efudex    Seborrheic dermatitis  Improved seborrheic dermatitis scalp temples chest at central chest we will add Nizoral shampoo to this area seborrhea flaring on the beard area will use triamcinolone, ketoconazole cream continue current regimen overall doing well  Pathophysiology discussed with patient.  Therapeutic options reviewed.  See  Medications in grid.  Instructions reviewed at length.  Continue current regimen      Rosacea..Rosacea.  Stable continue topical metronidazole has not been using this as consistently use daily over the central face meds in grid.  Skin care instructions reviewed.  Pathophysiology reviewed.  Chronic recurrent nature discussed.  Patient will let us know how they are doing over the next several weeks.  Await clinical response to above therapy.  Continue metronidazole, Nizoral.  Consider doxy if worsening  Okay presently patient under more stress will monitor patient's wife with recent diagnosis of hydrocephalus problems with her shunt.  Seborrheic dermatitis Nizoral, path ophysiology over-the-counter dandruff shampoos discussed.  Overall doing fairly well  Improved overall more diffuse dermatitis over the arms  hands chest back add Zyrtec due to twice daily.  Continue triamcinolone as needed does have this at home await response if not helping he will let us know.  We will plan routine follow-up in 6 months    Waxy tan plaques at temples, parietal scalp.  Reassurance given regarding benign keratoses.      Eczema, notalgia paresthetica upper back stable.    Hand dermatitis triamcinolone as needed.  Continue regular follow-ups we will plan recheck in 6 months or as needed    Recheck 4 to 6 months or as needed  Please refer to map for specific lesions.  See additional diagnoses.  Pros cons of various therapies, risks benefits discussed.Pathophysiology discussed with patient.  Therapeutic options reviewed.  See  Medications in grid.  Instructions reviewed at length.    Benign nevi, seborrheic  keratoses, cherry angiomas:  Reassurance regarding other benign skin lesions.    Monitor for new or changing lesions. Signs and symptoms of skin cancer, ABCDE's of melanoma ( additional information available at AAD.org, skincancer.org) Encourage Sunscreen (broad-spectrum, ideally mineral-based-UVA/UVB -SPF 30 or higher) use encouraged, sun protection/sun protective clothing, self exams reviewed Followup as noted RTC ---routine checkup 6 mos -one year or p.r.n.    Encounter Times   Including precharting, reviewing chart, prior notes obtaining history: 10 minutes, medical exam :10 minutes, notes on body map, plan, counseling 10minutes My total time spent caring for the patient on the day of the encounter: 30 minutes     The patient indicates understanding of these issues and agrees to the plan.  The patient is asked to return as noted in follow-up/ above.    This note was generated using Dragon voice recognition software.  Please contact me regarding any confusion resulting from errors in recognition..  Note to patient and family: The 21st Century Cures Act makes medical notes like these available to patients. However, be advised this is a  medical document. It is intended as [id number] communication and monitoring of a patient's care needs. It is written in medical language and may contain abbreviations or verbiage that are unfamiliar. It may appear blunt or direct. Medical documents are intended to carry relevant information, facts as evident and the clinical opinion of the practitioner.

## (undated) DEVICE — KIT ENDO ORCAPOD 160/180/190

## (undated) DEVICE — TRAP POLYP W/ 2 SPEC TY CLR MAGNIFYING WIND

## (undated) DEVICE — MEDI-VAC NON-CONDUCTIVE SUCTION TUBING 6MM X 1.8M (6FT.) L: Brand: CARDINAL HEALTH

## (undated) DEVICE — KIT CLEAN ENDOKIT 1.1OZ GOWNX2

## (undated) DEVICE — Device: Brand: DUAL NARE NASAL CANNULAE FEMALE LUER CON 7FT O2 TUBE

## (undated) DEVICE — SNARE ENDOSCOPIC 10MM ROUND

## (undated) DEVICE — 60 ML SYRINGE REGULAR TIP: Brand: MONOJECT

## (undated) NOTE — MR AVS SNAPSHOT
[facility] [place] [address] [address] [id number] [phone]               Thank you for choosing us for your health care visit with Rajeev Mcknight MD.  We are glad to serve you and happy to provide you with this summary of Referral Information     Referral Order Referred to Address [facility] Phone Visits Status Diagnosis                 MyChart     Call the [employer] for assistance with your inactive [employer] account    If you have questions, you can call [phone] to talk to [email or web address]

## (undated) NOTE — LETTER
[address]  Authorization for Surgical Operation and Procedure                                                                                           I hereby authorize Liyah Etienne MD, my physician and his/her assistants (if applicable), which may include medical students, residents, and/or fellows, to perform the following surgical operation/ procedure and administer such anesthesia as may be determined necessary by my physician: Operation/Procedure name (s) COLONOSCOPY on [employer]   2. I recognize that during the surgical operation/procedure, unforeseen conditions may necessitate additional or different procedures than those listed above. I, therefore, further authorize and request that the above-named surgeon, assistants, or designees perform such procedures as are, in their judgment, necessary and desirable. 3.   My surgeon/physician has discussed prior to my surgery the potential benefits, risks and side effects of this procedure; the likelihood of achieving goals; and potential problems that might occur during recuperation. They also discussed reasonable alternatives to the procedure, including risks, benefits, and side effects related to the alternatives and risks related to not receiving this procedure. I have had all my questions answered and I acknowledge that no guarantee has been made as to the result that may be obtained. 4.   Should the need arise during my operation/procedure, which includes change of level of care prior to discharge, I also consent to the administration of blood and/or blood products. Further, I understand that despite careful testing and screening of blood or blood products by collecting agencies, I may still be subject to ill effects as a result of receiving a blood transfusion and/or blood products.   The following are some, but not all, of the potential risks that can occur: fever and allergic reactions, hemolytic reactions, transmission of diseases such as Hepatitis, AIDS and Cytomegalovirus (CMV) and fluid overload. In the event that I wish to have an autologous transfusion of my own blood, or a directed donor transfusion, I will discuss this with my physician. Check only if Refusing Blood or Blood Products  I understand refusal of blood or blood products as deemed necessary by my physician may have serious consequences to my condition to include possible death. I hereby assume responsibility for my refusal and release the hospital, its personnel, and my physicians from any responsibility for the consequences of my refusal.    o  Refuse   5. I authorize the use of any specimen, organs, tissues, body parts or foreign objects that may be removed from my body during the operation/procedure for diagnosis, research or teaching purposes and their subsequent disposal by hospital authorities. I also authorize the release of specimen test results and/or written reports to my treating physician on the hospital medical staff or other referring or consulting physicians involved in my care, at the discretion of the Pathologist or my treating physician. 6.   I consent to the photographing or videotaping of the operations or procedures to be performed, including appropriate portions of my body for medical, scientific, or educational purposes, provided my identity is not revealed by the pictures or by descriptive texts accompanying them. If the procedure has been photographed/videotaped, the surgeon will obtain the original picture, image, videotape or CD. The hospital will not be responsible for storage, release or maintenance of the picture, image, tape or CD.    7.   I consent to the presence of a  or observers in the operating room as deemed necessary by my physician or their designees.     8.   I recognize that in the event my procedure results in extended X-Ray/fluoroscopy time, I may develop a skin reaction. 9. If I have a Do Not Attempt Resuscitation (DNAR) order in place, that status will be suspended while in the operating room, procedural suite, and during the recovery period unless otherwise explicitly stated by me (or a person authorized to consent on my behalf). The surgeon or my attending physician will determine when the applicable recovery period ends for purposes of reinstating the DNAR order. 10. Patients having a sterilization procedure: I understand that if the procedure is successful the results will be permanent and it will therefore be impossible for me to inseminate, conceive, or bear children. I also understand that the procedure is intended to result in sterility, although the result has not been guaranteed. 11. I acknowledge that my physician has explained sedation/analgesia administration to me including the risk and benefits I consent to the administration of sedation/analgesia as may be necessary or desirable in the judgment of my physician. I CERTIFY THAT I HAVE READ AND FULLY UNDERSTAND THE ABOVE CONSENT TO OPERATION and/or OTHER PROCEDURE.     _________________________________________ _________________________________     ___________________________________  Signature of Patient     Signature of Responsible Person                   Printed Name of Responsible Person                              _________________________________________ ______________________________        ___________________________________  Signature of Witness         Date  Time         Relationship to Patient    STATEMENT OF PHYSICIAN My signature below affirms that prior to the time of the procedure; I have explained to the patient and/or his/her legal representative, the risks and benefits involved in the proposed treatment and any reasonable alternative to the proposed treatment.  I have also explained the risks and benefits involved in refusal of the proposed treatment and alternatives to the proposed treatment and have answered the patient's questions.  If I have a significant financial interest in a co-management agreement or a significant financial interest in any product or implant, or other significant relationship used in this procedure/surgery, I have disclosed this and had a discussion with my patient.     _______________________________________________________________ _____________________________  [employer] of Physician)                                                                                         (Date)                                   (Time)  Patient Name: Luis M Silva    : 1950   Printed: 10/6/2023      Medical Record #: [id number]                                              Page 1 of 1

## (undated) NOTE — MR AVS SNAPSHOT
[facility] SPECIALTY [facility] [place] [address] [address]  [id number] [phone]               Thank you for choosing us for your health care visit with Eli Coon MD.  We are glad to serve you and happy to provide you with this summary of [employer]k for assistance with your inactive [employer] account    If you have questions, you can call [phone] to talk to our [facility] Staff. Remember, [employer] is NOT to be used for urgent needs. For medical emergencies, dial 911.     Vi